# Patient Record
Sex: FEMALE | Race: WHITE | Employment: FULL TIME | URBAN - METROPOLITAN AREA
[De-identification: names, ages, dates, MRNs, and addresses within clinical notes are randomized per-mention and may not be internally consistent; named-entity substitution may affect disease eponyms.]

---

## 2018-12-28 ENCOUNTER — HOSPITAL ENCOUNTER (EMERGENCY)
Age: 28
Discharge: HOME | End: 2018-12-28

## 2018-12-28 VITALS
OXYGEN SATURATION: 98 % | WEIGHT: 210 LBS | HEIGHT: 69 IN | BODY MASS INDEX: 31.1 KG/M2 | SYSTOLIC BLOOD PRESSURE: 131 MMHG | TEMPERATURE: 98.1 F | RESPIRATION RATE: 16 BRPM | DIASTOLIC BLOOD PRESSURE: 67 MMHG | HEART RATE: 84 BPM
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
## 2018-12-28 PROCEDURE — 90715 TDAP VACCINE 7 YRS/> IM: CPT | Performed by: PHYSICIAN ASSISTANT

## 2018-12-28 PROCEDURE — 6360000002 HC RX W HCPCS: Performed by: PHYSICIAN ASSISTANT

## 2018-12-28 PROCEDURE — 90471 IMMUNIZATION ADMIN: CPT | Performed by: PHYSICIAN ASSISTANT

## 2018-12-28 PROCEDURE — 99283 EMERGENCY DEPT VISIT LOW MDM: CPT

## 2018-12-28 RX ORDER — AMOXICILLIN AND CLAVULANATE POTASSIUM 875; 125 MG/1; MG/1
1 TABLET, FILM COATED ORAL 2 TIMES DAILY
Qty: 6 TABLET | Refills: 0 | Status: SHIPPED | OUTPATIENT
Start: 2018-12-28 | End: 2018-12-31

## 2018-12-28 RX ADMIN — TETANUS TOXOID, REDUCED DIPHTHERIA TOXOID AND ACELLULAR PERTUSSIS VACCINE, ADSORBED 0.5 ML: 5; 2.5; 8; 8; 2.5 SUSPENSION INTRAMUSCULAR at 12:16

## 2018-12-28 ASSESSMENT — PAIN DESCRIPTION - PAIN TYPE: TYPE: ACUTE PAIN

## 2018-12-28 ASSESSMENT — PAIN SCALES - GENERAL: PAINLEVEL_OUTOF10: 6

## 2018-12-28 ASSESSMENT — PAIN DESCRIPTION - DESCRIPTORS: DESCRIPTORS: SORE

## 2018-12-28 ASSESSMENT — PAIN DESCRIPTION - ORIENTATION: ORIENTATION: LEFT;RIGHT

## 2018-12-28 ASSESSMENT — PAIN DESCRIPTION - LOCATION: LOCATION: LEG

## 2019-03-06 ENCOUNTER — HOSPITAL ENCOUNTER (EMERGENCY)
Age: 29
Discharge: HOME | End: 2019-03-06
Payer: COMMERCIAL

## 2019-03-06 VITALS
OXYGEN SATURATION: 100 % | DIASTOLIC BLOOD PRESSURE: 71 MMHG | BODY MASS INDEX: 31.1 KG/M2 | WEIGHT: 210 LBS | HEART RATE: 95 BPM | RESPIRATION RATE: 16 BRPM | TEMPERATURE: 98.3 F | SYSTOLIC BLOOD PRESSURE: 117 MMHG | HEIGHT: 69 IN

## 2019-03-06 DIAGNOSIS — R51.9 NONINTRACTABLE HEADACHE, UNSPECIFIED CHRONICITY PATTERN, UNSPECIFIED HEADACHE TYPE: ICD-10-CM

## 2019-03-06 DIAGNOSIS — R52 BODY ACHES: Primary | ICD-10-CM

## 2019-03-06 DIAGNOSIS — R05.9 COUGH: ICD-10-CM

## 2019-03-06 PROCEDURE — 96374 THER/PROPH/DIAG INJ IV PUSH: CPT

## 2019-03-06 PROCEDURE — 99283 EMERGENCY DEPT VISIT LOW MDM: CPT

## 2019-03-06 PROCEDURE — 96375 TX/PRO/DX INJ NEW DRUG ADDON: CPT

## 2019-03-06 PROCEDURE — 2580000003 HC RX 258: Performed by: PHYSICIAN ASSISTANT

## 2019-03-06 PROCEDURE — 6360000002 HC RX W HCPCS: Performed by: PHYSICIAN ASSISTANT

## 2019-03-06 RX ORDER — 0.9 % SODIUM CHLORIDE 0.9 %
1000 INTRAVENOUS SOLUTION INTRAVENOUS ONCE
Status: COMPLETED | OUTPATIENT
Start: 2019-03-06 | End: 2019-03-06

## 2019-03-06 RX ORDER — KETOROLAC TROMETHAMINE 30 MG/ML
30 INJECTION, SOLUTION INTRAMUSCULAR; INTRAVENOUS ONCE
Status: COMPLETED | OUTPATIENT
Start: 2019-03-06 | End: 2019-03-06

## 2019-03-06 RX ORDER — IBUPROFEN 600 MG/1
600 TABLET ORAL EVERY 6 HOURS PRN
Qty: 30 TABLET | Refills: 0 | Status: SHIPPED | OUTPATIENT
Start: 2019-03-06

## 2019-03-06 RX ORDER — ACETAMINOPHEN 500 MG
500 TABLET ORAL EVERY 6 HOURS PRN
Qty: 30 TABLET | Refills: 0 | Status: ON HOLD | OUTPATIENT
Start: 2019-03-06 | End: 2022-02-23 | Stop reason: HOSPADM

## 2019-03-06 RX ORDER — ONDANSETRON 2 MG/ML
4 INJECTION INTRAMUSCULAR; INTRAVENOUS ONCE
Status: COMPLETED | OUTPATIENT
Start: 2019-03-06 | End: 2019-03-06

## 2019-03-06 RX ADMIN — ONDANSETRON 4 MG: 2 INJECTION INTRAMUSCULAR; INTRAVENOUS at 08:38

## 2019-03-06 RX ADMIN — SODIUM CHLORIDE 1000 ML: 9 INJECTION, SOLUTION INTRAVENOUS at 08:02

## 2019-03-06 RX ADMIN — KETOROLAC TROMETHAMINE 30 MG: 30 INJECTION, SOLUTION INTRAMUSCULAR at 08:38

## 2019-03-06 ASSESSMENT — PAIN DESCRIPTION - LOCATION: LOCATION: GENERALIZED

## 2019-03-06 ASSESSMENT — PAIN DESCRIPTION - PAIN TYPE: TYPE: ACUTE PAIN

## 2019-03-06 ASSESSMENT — PAIN SCALES - GENERAL
PAINLEVEL_OUTOF10: 6
PAINLEVEL_OUTOF10: 7

## 2019-03-08 ENCOUNTER — APPOINTMENT (OUTPATIENT)
Dept: GENERAL RADIOLOGY | Age: 29
End: 2019-03-08
Payer: COMMERCIAL

## 2019-03-08 ENCOUNTER — HOSPITAL ENCOUNTER (EMERGENCY)
Age: 29
Discharge: HOME | End: 2019-03-08
Attending: EMERGENCY MEDICINE
Payer: COMMERCIAL

## 2019-03-08 VITALS
TEMPERATURE: 101 F | BODY MASS INDEX: 31.1 KG/M2 | HEIGHT: 69 IN | DIASTOLIC BLOOD PRESSURE: 62 MMHG | HEART RATE: 87 BPM | RESPIRATION RATE: 18 BRPM | OXYGEN SATURATION: 96 % | SYSTOLIC BLOOD PRESSURE: 114 MMHG | WEIGHT: 210 LBS

## 2019-03-08 DIAGNOSIS — R50.9 FEVER, UNSPECIFIED FEVER CAUSE: ICD-10-CM

## 2019-03-08 DIAGNOSIS — R53.83 FATIGUE, UNSPECIFIED TYPE: ICD-10-CM

## 2019-03-08 DIAGNOSIS — J02.9 SORE THROAT: Primary | ICD-10-CM

## 2019-03-08 DIAGNOSIS — R06.00 DYSPNEA, UNSPECIFIED TYPE: ICD-10-CM

## 2019-03-08 DIAGNOSIS — N39.0 URINARY TRACT INFECTION WITHOUT HEMATURIA, SITE UNSPECIFIED: ICD-10-CM

## 2019-03-08 DIAGNOSIS — R68.89 FLU-LIKE SYMPTOMS: ICD-10-CM

## 2019-03-08 LAB
ALBUMIN SERPL-MCNC: 3.5 GM/DL (ref 3.4–5)
ALP BLD-CCNC: 95 IU/L (ref 40–129)
ALT SERPL-CCNC: 25 U/L (ref 10–40)
ANION GAP SERPL CALCULATED.3IONS-SCNC: 16 MMOL/L (ref 4–16)
AST SERPL-CCNC: 20 IU/L (ref 15–37)
BACTERIA: ABNORMAL /HPF
BASOPHILS ABSOLUTE: 0 K/CU MM
BASOPHILS RELATIVE PERCENT: 0.3 % (ref 0–1)
BILIRUB SERPL-MCNC: 0.9 MG/DL (ref 0–1)
BILIRUBIN URINE: NEGATIVE MG/DL
BLOOD, URINE: ABNORMAL
BUN BLDV-MCNC: 6 MG/DL (ref 6–23)
CALCIUM SERPL-MCNC: 8.1 MG/DL (ref 8.3–10.6)
CHLORIDE BLD-SCNC: 99 MMOL/L (ref 99–110)
CLARITY: ABNORMAL
CO2: 22 MMOL/L (ref 21–32)
COLOR: ABNORMAL
CREAT SERPL-MCNC: 0.8 MG/DL (ref 0.6–1.1)
DIFFERENTIAL TYPE: ABNORMAL
EOSINOPHILS ABSOLUTE: 0 K/CU MM
EOSINOPHILS RELATIVE PERCENT: 0 % (ref 0–3)
GFR AFRICAN AMERICAN: >60 ML/MIN/1.73M2
GFR NON-AFRICAN AMERICAN: >60 ML/MIN/1.73M2
GLUCOSE BLD-MCNC: 114 MG/DL (ref 70–99)
GLUCOSE, URINE: NEGATIVE MG/DL
GONADOTROPIN, CHORIONIC (HCG) QUANT: <0.5 UIU/ML
HCT VFR BLD CALC: 41.6 % (ref 37–47)
HEMOGLOBIN: 13.2 GM/DL (ref 12.5–16)
HETEROPHILE ANTIBODIES: NEGATIVE
IMMATURE NEUTROPHIL %: 0.4 % (ref 0–0.43)
KETONES, URINE: ABNORMAL MG/DL
LEUKOCYTE ESTERASE, URINE: ABNORMAL
LYMPHOCYTES ABSOLUTE: 1.2 K/CU MM
LYMPHOCYTES RELATIVE PERCENT: 12.6 % (ref 24–44)
MCH RBC QN AUTO: 28.4 PG (ref 27–31)
MCHC RBC AUTO-ENTMCNC: 31.7 % (ref 32–36)
MCV RBC AUTO: 89.5 FL (ref 78–100)
MONOCYTES ABSOLUTE: 1.2 K/CU MM
MONOCYTES RELATIVE PERCENT: 13.1 % (ref 0–4)
MUCUS: ABNORMAL HPF
NITRITE URINE, QUANTITATIVE: POSITIVE
NON SQUAM EPI CELLS: 1 /HPF
NUCLEATED RBC %: 0 %
PDW BLD-RTO: 12.8 % (ref 11.7–14.9)
PH, URINE: 7 (ref 5–8)
PLATELET # BLD: 154 K/CU MM (ref 140–440)
PMV BLD AUTO: 11.6 FL (ref 7.5–11.1)
POTASSIUM SERPL-SCNC: 3.4 MMOL/L (ref 3.5–5.1)
PROTEIN UA: 100 MG/DL
RAPID INFLUENZA  B AGN: NEGATIVE
RAPID INFLUENZA A AGN: NEGATIVE
RBC # BLD: 4.65 M/CU MM (ref 4.2–5.4)
RBC URINE: 21 /HPF (ref 0–6)
SEGMENTED NEUTROPHILS ABSOLUTE COUNT: 6.8 K/CU MM
SEGMENTED NEUTROPHILS RELATIVE PERCENT: 73.6 % (ref 36–66)
SODIUM BLD-SCNC: 137 MMOL/L (ref 135–145)
SPECIFIC GRAVITY UA: 1.02 (ref 1–1.03)
SQUAMOUS EPITHELIAL: 10 /HPF
TOTAL IMMATURE NEUTOROPHIL: 0.04 K/CU MM
TOTAL NUCLEATED RBC: 0 K/CU MM
TOTAL PROTEIN: 7.3 GM/DL (ref 6.4–8.2)
TRICHOMONAS: ABNORMAL /HPF
UROBILINOGEN, URINE: 2 MG/DL (ref 0.2–1)
WBC # BLD: 9.2 K/CU MM (ref 4–10.5)
WBC CLUMP: ABNORMAL /HPF
WBC UA: 120 /HPF (ref 0–5)

## 2019-03-08 PROCEDURE — 80053 COMPREHEN METABOLIC PANEL: CPT

## 2019-03-08 PROCEDURE — 86318 IA INFECTIOUS AGENT ANTIBODY: CPT

## 2019-03-08 PROCEDURE — 2580000003 HC RX 258: Performed by: EMERGENCY MEDICINE

## 2019-03-08 PROCEDURE — 87804 INFLUENZA ASSAY W/OPTIC: CPT

## 2019-03-08 PROCEDURE — 87430 STREP A AG IA: CPT

## 2019-03-08 PROCEDURE — 71045 X-RAY EXAM CHEST 1 VIEW: CPT

## 2019-03-08 PROCEDURE — 96365 THER/PROPH/DIAG IV INF INIT: CPT

## 2019-03-08 PROCEDURE — 81001 URINALYSIS AUTO W/SCOPE: CPT

## 2019-03-08 PROCEDURE — 36415 COLL VENOUS BLD VENIPUNCTURE: CPT

## 2019-03-08 PROCEDURE — 85025 COMPLETE CBC W/AUTO DIFF WBC: CPT

## 2019-03-08 PROCEDURE — 87081 CULTURE SCREEN ONLY: CPT

## 2019-03-08 PROCEDURE — 99291 CRITICAL CARE FIRST HOUR: CPT

## 2019-03-08 PROCEDURE — 93010 ELECTROCARDIOGRAM REPORT: CPT | Performed by: INTERNAL MEDICINE

## 2019-03-08 PROCEDURE — 84702 CHORIONIC GONADOTROPIN TEST: CPT

## 2019-03-08 PROCEDURE — 6370000000 HC RX 637 (ALT 250 FOR IP): Performed by: EMERGENCY MEDICINE

## 2019-03-08 PROCEDURE — 6360000002 HC RX W HCPCS: Performed by: EMERGENCY MEDICINE

## 2019-03-08 PROCEDURE — 93005 ELECTROCARDIOGRAM TRACING: CPT | Performed by: EMERGENCY MEDICINE

## 2019-03-08 RX ORDER — ACETAMINOPHEN 325 MG/1
650 TABLET ORAL EVERY 6 HOURS PRN
Qty: 120 TABLET | Refills: 3 | Status: ON HOLD | OUTPATIENT
Start: 2019-03-08 | End: 2022-02-23 | Stop reason: HOSPADM

## 2019-03-08 RX ORDER — NAPROXEN 500 MG/1
500 TABLET ORAL 2 TIMES DAILY
Qty: 60 TABLET | Refills: 0 | Status: ON HOLD | OUTPATIENT
Start: 2019-03-08 | End: 2022-02-23 | Stop reason: HOSPADM

## 2019-03-08 RX ORDER — 0.9 % SODIUM CHLORIDE 0.9 %
1000 INTRAVENOUS SOLUTION INTRAVENOUS ONCE
Status: COMPLETED | OUTPATIENT
Start: 2019-03-08 | End: 2019-03-08

## 2019-03-08 RX ORDER — ACETAMINOPHEN 500 MG
1000 TABLET ORAL ONCE
Status: COMPLETED | OUTPATIENT
Start: 2019-03-08 | End: 2019-03-08

## 2019-03-08 RX ORDER — ONDANSETRON 4 MG/1
4 TABLET, ORALLY DISINTEGRATING ORAL EVERY 8 HOURS PRN
Qty: 15 TABLET | Refills: 0 | Status: SHIPPED | OUTPATIENT
Start: 2019-03-08 | End: 2022-02-18

## 2019-03-08 RX ORDER — CEPHALEXIN 500 MG/1
500 CAPSULE ORAL 4 TIMES DAILY
Qty: 28 CAPSULE | Refills: 0 | Status: SHIPPED | OUTPATIENT
Start: 2019-03-08 | End: 2019-03-15

## 2019-03-08 RX ORDER — FAMOTIDINE 20 MG/1
20 TABLET, FILM COATED ORAL 2 TIMES DAILY
Qty: 14 TABLET | Refills: 0 | Status: SHIPPED | OUTPATIENT
Start: 2019-03-08 | End: 2020-07-24

## 2019-03-08 RX ADMIN — ACETAMINOPHEN 1000 MG: 500 TABLET ORAL at 01:35

## 2019-03-08 RX ADMIN — SODIUM CHLORIDE 1000 ML: 9 INJECTION, SOLUTION INTRAVENOUS at 01:35

## 2019-03-08 RX ADMIN — CEFTRIAXONE SODIUM 1 G: 1 INJECTION, POWDER, FOR SOLUTION INTRAMUSCULAR; INTRAVENOUS at 04:40

## 2019-03-08 ASSESSMENT — ENCOUNTER SYMPTOMS
RHINORRHEA: 1
GASTROINTESTINAL NEGATIVE: 1
ALLERGIC/IMMUNOLOGIC NEGATIVE: 1
EYES NEGATIVE: 1
COUGH: 1
SORE THROAT: 1

## 2019-03-08 ASSESSMENT — PAIN DESCRIPTION - PAIN TYPE: TYPE: ACUTE PAIN

## 2019-03-08 ASSESSMENT — PAIN SCALES - GENERAL: PAINLEVEL_OUTOF10: 8

## 2019-03-08 ASSESSMENT — PAIN DESCRIPTION - LOCATION: LOCATION: HEAD;LEG

## 2019-03-10 LAB
CULTURE: ABNORMAL
Lab: ABNORMAL
SPECIMEN: ABNORMAL
STREP A DIRECT SCREEN: NEGATIVE

## 2019-03-19 LAB
EKG ATRIAL RATE: 94 BPM
EKG DIAGNOSIS: NORMAL
EKG P AXIS: 39 DEGREES
EKG P-R INTERVAL: 124 MS
EKG Q-T INTERVAL: 344 MS
EKG QRS DURATION: 70 MS
EKG QTC CALCULATION (BAZETT): 430 MS
EKG R AXIS: 47 DEGREES
EKG T AXIS: 18 DEGREES
EKG VENTRICULAR RATE: 94 BPM

## 2020-07-24 ENCOUNTER — HOSPITAL ENCOUNTER (EMERGENCY)
Age: 30
Discharge: HOME | End: 2020-07-24
Payer: COMMERCIAL

## 2020-07-24 VITALS
SYSTOLIC BLOOD PRESSURE: 127 MMHG | DIASTOLIC BLOOD PRESSURE: 75 MMHG | OXYGEN SATURATION: 98 % | TEMPERATURE: 98.1 F | WEIGHT: 210 LBS | HEIGHT: 69 IN | RESPIRATION RATE: 16 BRPM | BODY MASS INDEX: 31.1 KG/M2 | HEART RATE: 70 BPM

## 2020-07-24 PROCEDURE — 6370000000 HC RX 637 (ALT 250 FOR IP): Performed by: PHYSICIAN ASSISTANT

## 2020-07-24 PROCEDURE — 99283 EMERGENCY DEPT VISIT LOW MDM: CPT

## 2020-07-24 RX ORDER — FAMOTIDINE 20 MG/1
20 TABLET, FILM COATED ORAL ONCE
Status: COMPLETED | OUTPATIENT
Start: 2020-07-24 | End: 2020-07-24

## 2020-07-24 RX ORDER — FAMOTIDINE 20 MG/1
20 TABLET, FILM COATED ORAL 2 TIMES DAILY
Qty: 20 TABLET | Refills: 0 | Status: ON HOLD | OUTPATIENT
Start: 2020-07-24 | End: 2022-02-23

## 2020-07-24 RX ORDER — DIPHENHYDRAMINE HCL 25 MG
25 CAPSULE ORAL EVERY 6 HOURS PRN
Qty: 20 CAPSULE | Refills: 0 | Status: SHIPPED | OUTPATIENT
Start: 2020-07-24 | End: 2020-08-03

## 2020-07-24 RX ORDER — PREDNISONE 50 MG/1
50 TABLET ORAL DAILY
Qty: 7 TABLET | Refills: 0 | Status: SHIPPED | OUTPATIENT
Start: 2020-07-24 | End: 2020-07-31

## 2020-07-24 RX ORDER — SULFAMETHOXAZOLE AND TRIMETHOPRIM 800; 160 MG/1; MG/1
1 TABLET ORAL 2 TIMES DAILY
Qty: 20 TABLET | Refills: 0 | Status: SHIPPED | OUTPATIENT
Start: 2020-07-24 | End: 2020-08-03

## 2020-07-24 RX ORDER — DIPHENHYDRAMINE HCL 25 MG
25 TABLET ORAL ONCE
Status: COMPLETED | OUTPATIENT
Start: 2020-07-24 | End: 2020-07-24

## 2020-07-24 RX ADMIN — DIPHENHYDRAMINE HYDROCHLORIDE 25 MG: 25 TABLET ORAL at 09:31

## 2020-07-24 RX ADMIN — FAMOTIDINE 20 MG: 20 TABLET ORAL at 09:31

## 2020-07-24 RX ADMIN — PREDNISONE 50 MG: 20 TABLET ORAL at 09:31

## 2020-07-24 ASSESSMENT — VISUAL ACUITY
OU: 20/20
OD: 20/20
OS: 20/20

## 2020-07-24 NOTE — ED PROVIDER NOTES
EMERGENCY DEPARTMENT ENCOUNTER      PCP: No primary care provider on file. CHIEF COMPLAINT    Chief Complaint   Patient presents with    Eye Problem     swelling and redness to left eye. denies drainage       HPI    Angelina Parent is a 35 y.o. female who presents with swelling, redness and pruritus around the left eye. Symptoms began 2 days ago while she was at work. She states that she had some itchiness around the eye at that time. She was working on cleaning of the vehicle and states that she may have been exposed to some kind of cleaning agent. She took some Benadryl and did have improvement of symptoms. Since then she has had continued redness and swelling periorbital swelling of left eye and a couple spots on her forehead also have some redness and itchiness. No eye redness or foreign body sensation. No visual changes, flashes or floaters. She denies contact lens use. No fevers, chills. No pain with eye movement. No associated shortness of breath or wheezing. No known allergies. REVIEW OF SYSTEMS    General: No fevers or chills  ENT:-See HPI  GI: No abdominal pain, nausea or vomiting  Skin: No new rash  Pulmonary: No shortness of breath or new cough    All other review of systems are negative  See HPI and nursing notes for additional information     PAST MEDICAL and SURGICAL HISTORY    Past Medical History:   Diagnosis Date    Gallbladder disease      History reviewed. No pertinent surgical history.     CURRENT MEDICATIONS    Current Outpatient Rx   Medication Sig Dispense Refill    ondansetron (ZOFRAN ODT) 4 MG disintegrating tablet Take 1 tablet by mouth every 8 hours as needed for Nausea 15 tablet 0    naproxen (NAPROSYN) 500 MG tablet Take 1 tablet by mouth 2 times daily 60 tablet 0    acetaminophen (TYLENOL) 325 MG tablet Take 2 tablets by mouth every 6 hours as needed for Pain 120 tablet 3    famotidine (PEPCID) 20 MG tablet Take 1 tablet by mouth 2 times daily 14 tablet 0    moist, mildly injected. EOMI without pain  Funduscopic exam reveals red reflex intact with no obvious retinal abnormalities. No foreign bodies or obvious corneal deficit. No hyphema seen on tangential light    HENT:   Left-sided periorbital erythema and swelling. Still able to see entire pupil and iris of left eye. There are 2 small areas of macular erythema, slightly raised noted to forehead. No crepitus or tenderness to palpation of these areas. No streaking of erythema. Atraumatic, external ears normal, nose normal, oropharynx moist, no pharyngeal exudates. Oropharynx patent without tonsillar hypertrophy or exudates. Neck/Lymphatics: supple, no JVD, no swollen nodes   Respiratory:  No retractions, lungs clear without wheezing  Cardiovascular:  No JVD  Integument:  Warm dry skin, no obvious rash, no evidence of Cali sign, See HENT  Neurologic:  No slurred speech, normal gait       Visual acuity:  See nursing note        ED COURSE & MEDICAL DECISION MAKING       Vital signs and nursing notes reviewed during ED course. I have independently evaluated this patient . Supervising MD present in the Emergency Department, available for consultation, throughout entirety of  patient care. All pertinent Lab data and radiographic results reviewed with patient at bedside. Patient presents as above. She is hemodynamically stable, afebrile. No conjunctival injection, foreign body sensation, pain with extraocular eye movements. Symptoms appear to only be in periorbital region and and couple areas on forehead most consistent with allergic reaction. She is given dose of prednisone, Benadryl and Pepcid here in the ED with mild improvement of symptoms. Will discharge with prednisone, Benadryl and Pepcid. We will also discharge with watch and wait prescription of Bactrim if symptoms are not gradually improving to treat for possible early preseptal cellulitis.   No evidence of deep space infection or other acute emergent process at this time including orbital cellulitis/abscess. She is given primary care follow-up and is to immediately return with any new or worsening symptoms. The patient and/or the family were informed of the results of any tests/labs/imaging, the treatment plan, and time was allotted to answer questions. As patient denies flashes/floaters, changes in visual acquity, or pain with extra ocular movement, I believe patient does not need further emergent imaging and is reasonable to discharge with close follow up. I estimate there is LOW risk for RETAINED FOREIGN BODY, ULCERATION, DEEP SPACE INFECTION (Ex. POST-SEPTAL ABSCESS), ACUTE GLAUCOMA, PENETRATING GLOBE INJURY, RETINAL DETACHMENT, or MENINGITIS thus I consider the discharge disposition reasonable. Lenin Jennings (or their surrogate) and I have discussed the diagnosis and risks, and we agree with discharging home with close follow-up. We also discussed returning to the Emergency Department immediately if new or worsening symptoms occur. We have discussed the symptoms which are most concerning that necessitate immediate return. Clinical  IMPRESSION    1. Periorbital erythema    2. Periorbital swelling        Discharge with medication and followup with ophthalmologist (see EMR)    Diagnosis and plan discussed in detail with patient who understands and agrees. Patient agrees to return emergency department if symptoms worsen or any new symptoms develop. Comment: Please note this report has been produced using speech recognition software and may contain errors related to that system including errors in grammar, punctuation, and spelling, as well as words and phrases that may be inappropriate. If there are any questions or concerns please feel free to contact the dictating provider for clarification.         Shruthi Whittaker [place]  07/24/20 8946

## 2020-08-02 ENCOUNTER — HOSPITAL ENCOUNTER (EMERGENCY)
Age: 30
Discharge: HOME | End: 2020-08-02
Attending: EMERGENCY MEDICINE
Payer: COMMERCIAL

## 2020-08-02 VITALS
WEIGHT: 210 LBS | BODY MASS INDEX: 31.1 KG/M2 | DIASTOLIC BLOOD PRESSURE: 60 MMHG | TEMPERATURE: 97.6 F | RESPIRATION RATE: 16 BRPM | SYSTOLIC BLOOD PRESSURE: 94 MMHG | OXYGEN SATURATION: 98 % | HEIGHT: 69 IN | HEART RATE: 97 BPM

## 2020-08-02 PROCEDURE — 99282 EMERGENCY DEPT VISIT SF MDM: CPT

## 2020-08-02 PROCEDURE — 6370000000 HC RX 637 (ALT 250 FOR IP): Performed by: EMERGENCY MEDICINE

## 2020-08-02 RX ORDER — PREDNISONE 20 MG/1
60 TABLET ORAL ONCE
Status: COMPLETED | OUTPATIENT
Start: 2020-08-02 | End: 2020-08-02

## 2020-08-02 RX ORDER — METHYLPREDNISOLONE 4 MG/1
TABLET ORAL
Qty: 1 KIT | Refills: 0 | Status: SHIPPED | OUTPATIENT
Start: 2020-08-02 | End: 2020-08-08

## 2020-08-02 RX ADMIN — PREDNISONE 60 MG: 20 TABLET ORAL at 09:45

## 2020-08-02 NOTE — ED NOTES
The patient states that the itchy skin and redness is on both arms and legs. The patient denies shortness of breath.      Virginia Lema RN  08/02/20 6708

## 2020-08-02 NOTE — ED TRIAGE NOTES
Pt to ED with c/o rash \"all over. \" Pt states was rx antibiotics and steroids last Friday for \"an allergic reaction. \" States she did not have a rash at that time. Pt a&ox4.

## 2020-08-02 NOTE — ED NOTES
Discharge instructions reviewed with patient. The patient voiced understanding of the discharge paperwork and received one prescriptions. The patient left alert and oriented with no questions or concerns.      Tushar Dai RN  08/02/20 0269

## 2020-08-02 NOTE — ED PROVIDER NOTES
Emergency Department Encounter    Patient: Deandre Virk  MRN: [id number]  : 1990  Date of Evaluation: 2020  ED Provider:  Tressa Ho    Triage Chief Complaint:   Rash    [place]:  Deandre Virk is a 35 y.o. female that presents with pruritic rash over her arms and legs that has been present for the last couple of days. She has been taking Benadryl and using cortisone cream for the rash. She was seen here last Friday and was diagnosed with allergic reaction to her eye but also put on Bactrim. No shortness of breath or wheezing. No nausea or vomiting. No lightheadedness or dizziness. She has never had a reaction like this previously. Denies any new detergents or soaps. No new exposures. She is not spending any significant amount of time outside. No fevers or chills. ROS - see HPI, below listed is current ROS at time of my eval:  General:  No fevers, no chills  Eyes:  no discharge  ENT:  No sore throat, no nasal congestion,  Respiratory:   no cough, no wheezing  Gastrointestinal:  No pain, no vomiting, no diarrhea  Musculoskeletal:  No muscle pain, no joint pain  Skin:  + rash, + pruritis, no easy bruising  Genitourinary:  No dysuria, no hematuria  Endocrine:  No unexpected weight gain, no unexpected weight loss  Extremities:  no pain    Past Medical History:   Diagnosis Date    Gallbladder disease      History reviewed. No pertinent surgical history. History reviewed. No pertinent family history.   Social History     Socioeconomic History    Marital status:      Spouse name: Not on file    Number of children: Not on file    Years of education: Not on file    Highest education level: Not on file   Occupational History    Not on file   Social Needs    Financial resource strain: Not on file    Food insecurity     Worry: Not on file     Inability: Not on file    Transportation needs     Medical: Not on file     Non-medical: Not on file   Tobacco Use    Smoking status: Never Smoker    Smokeless tobacco: Never Used   Substance and Sexual Activity    Alcohol use: No    Drug use: No    Sexual activity: Not on file   Lifestyle    Physical activity     Days per week: Not on file     Minutes per session: Not on file    Stress: Not on file   Relationships    Social connections     Talks on phone: Not on file     Gets together: Not on file     Attends L[employer]-day service: Not on file     Active member of club or organization: Not on file     Attends meetings of clubs or organizations: Not on file     Relationship status: Not on file    Intimate partner violence     Fear of current or ex partner: Not on file     Emotionally abused: Not on file     Physically abused: Not on file     Forced sexual activity: Not on file   Other Topics Concern    Not on file   Social History Narrative    Not on file     No current facility-administered medications for this encounter. Current Outpatient Medications   Medication Sig Dispense Refill    methylPREDNISolone (MEDROL DOSEPACK) 4 MG tablet Take by mouth.  1 kit 0    famotidine (PEPCID) 20 MG tablet Take 1 tablet by mouth 2 times daily 20 tablet 0    ondansetron (ZOFRAN ODT) 4 MG disintegrating tablet Take 1 tablet by mouth every 8 hours as needed for Nausea 15 tablet 0    naproxen (NAPROSYN) 500 MG tablet Take 1 tablet by mouth 2 times daily 60 tablet 0    acetaminophen (TYLENOL) 325 MG tablet Take 2 tablets by mouth every 6 hours as needed for Pain 120 tablet 3    acetaminophen (APAP EXTRA STRENGTH) 500 MG tablet Take 1 tablet by mouth every 6 hours as needed for Pain 30 tablet 0    ibuprofen (ADVIL;MOTRIN) 600 MG tablet Take 1 tablet by mouth every 6 hours as needed for Pain 30 tablet 0    dicyclomine (BENTYL) 10 MG capsule Take 1 capsule by mouth 4 times daily (before meals and nightly) 30 capsule 3     No Known Allergies    Nursing Notes Reviewed    Physical Exam:  Triage VS:    ED Triage Vitals [08/02/20 0916]   [facility] Vitals Group /76      Pulse 81      Resp 16      Temp 97.6 °F (36.4 °C)      Temp Source Oral      SpO2 98 %      Weight 210 lb (95.3 kg)      Height 5' 9\" (1.753 m)      Head Circumference       Peak Flow       Pain Score       Pain Loc       Pain Edu? Excl. in [address]? My pulse ox interpretation is - normal    General appearance:  No acute distress. Skin:  Warm. Dry. Blanchable erythematous macules over upper legs and arms, pruritic. No skin sloughing, blisters or drainage. Eye:  Extraocular movements intact. Ears, nose, mouth and throat:  Oral mucosa moist   Neck:  Trachea midline. Extremity:  No swelling. Normal ROM     Heart:  Regular  Perfusion:  intact  Respiratory:  Lungs clear to auscultation bilaterally. Respirations nonlabored. Abdominal:   Non distended. Neurological:  Alert and oriented    I have reviewed and interpreted all of the currently available lab results from this visit (if applicable):  No results found for this visit on 08/02/20. Radiographs (if obtained):  Radiologist's Report Reviewed:  No results found. MDM:  Patient presented secondary to a dermatologic abnormality. Patient's rash does not appear emergent at this time. I do suspect she may be having a reaction to the Bactrim. The patient does not have any evidence of emergent underlying infectious processes at this time and generally appears well. Patient does not have an exfoliative dermatitis. I do believe that the patient can be treated as an outpatient at this time. We will have her stop taking Bactrim and treat her with steroids. Have her continue taking Benadryl as needed. No wheezing and normal vital signs. I do not suspect anaphylaxis. Recommended close follow-up with her primary care physician. Given strict return precautions.   Patient understands that at this time there is no evidence for an underlying malignant process however early in the process of an illness and initial workup/evaluation can be reassuring/negative. The patient will be discharged, treated symptomatically, medication instructions/education provided, they understand and agree with the plan, return warnings given. I did don appropriate PPE (including N95 face mask, protective eye ware/safety glasses, gloves, hair covering, and no isolation gown), as recommended by the health facility/national standard best practice, during my bedside interactions with the patient. Clinical Impression:  1. Rash and other nonspecific skin eruption      Disposition referral (if applicable): Your Primary Care Physician    In 2 days      DO Jose Patel [phone]    In 2 days      [facility] Emergency Department  [facility] [phone] [id number] [phone]    As needed, If symptoms worsen    Disposition medications (if applicable):  Discharge Medication List as of 8/2/2020 10:17 AM      START taking these medications    Details   methylPREDNISolone (MEDROL DOSEPACK) 4 MG tablet Take by mouth., Disp-1 kit,R-0Print           ED Provider Disposition Time  DISPOSITION Decision To Discharge 08/02/2020 09:47:22 AM      Comment: Please note this report has been produced using speech recognition software and may contain errors related to that system including errors in grammar, punctuation, and spelling, as well as words and phrases that may be inappropriate. Efforts were made to edit the dictations.         Amando Gray MD  08/07/20 4250

## 2020-08-02 NOTE — ED NOTES
The patient does feel that the itchiness has become less apparent and she feels better.        Mariela Kc RN  08/02/20 1018

## 2020-08-02 NOTE — ED NOTES
Upon evaluation of the client, they are observed to be alert and oriented to person, place and situation. The head of the bed is elevated above 30 degrees. The client verbalizes appropriately to all questions and/or comments. The client also makes eye contact when prompted. The client also exhibits unlabored breathing, their skin is pink, warm & dry, and are observed to have relaxed extremities. When communicating, the client speaks with clear speech and normal tone and is in no apparent distress. The call light is within reach, the bed is in the low position and neither side rails are up.      Dinorah Sloan RN  08/02/20 1785

## 2022-02-17 PROCEDURE — 84702 CHORIONIC GONADOTROPIN TEST: CPT

## 2022-02-17 PROCEDURE — 80053 COMPREHEN METABOLIC PANEL: CPT

## 2022-02-17 PROCEDURE — 99284 EMERGENCY DEPT VISIT MOD MDM: CPT

## 2022-02-17 PROCEDURE — 83690 ASSAY OF LIPASE: CPT

## 2022-02-17 PROCEDURE — 85025 COMPLETE CBC W/AUTO DIFF WBC: CPT

## 2022-02-17 PROCEDURE — 96372 THER/PROPH/DIAG INJ SC/IM: CPT

## 2022-02-17 RX ORDER — KETOROLAC TROMETHAMINE 30 MG/ML
30 INJECTION, SOLUTION INTRAMUSCULAR; INTRAVENOUS ONCE
Status: COMPLETED | OUTPATIENT
Start: 2022-02-18 | End: 2022-02-18

## 2022-02-17 RX ORDER — ONDANSETRON 4 MG/1
8 TABLET, ORALLY DISINTEGRATING ORAL ONCE
Status: COMPLETED | OUTPATIENT
Start: 2022-02-18 | End: 2022-02-18

## 2022-02-17 ASSESSMENT — PAIN DESCRIPTION - LOCATION
LOCATION: ABDOMEN
LOCATION: ABDOMEN

## 2022-02-17 ASSESSMENT — PAIN - FUNCTIONAL ASSESSMENT: PAIN_FUNCTIONAL_ASSESSMENT: 0-10

## 2022-02-17 ASSESSMENT — PAIN SCALES - GENERAL
PAINLEVEL_OUTOF10: 8
PAINLEVEL_OUTOF10: 10

## 2022-02-17 ASSESSMENT — PAIN DESCRIPTION - PAIN TYPE
TYPE: ACUTE PAIN
TYPE: ACUTE PAIN

## 2022-02-17 ASSESSMENT — PAIN DESCRIPTION - ORIENTATION
ORIENTATION: RIGHT;UPPER
ORIENTATION: RIGHT

## 2022-02-17 ASSESSMENT — PAIN DESCRIPTION - DESCRIPTORS
DESCRIPTORS: SHARP;CONSTANT
DESCRIPTORS: CONSTANT

## 2022-02-17 ASSESSMENT — PAIN DESCRIPTION - PROGRESSION
CLINICAL_PROGRESSION: GRADUALLY WORSENING
CLINICAL_PROGRESSION: GRADUALLY WORSENING

## 2022-02-17 ASSESSMENT — PAIN DESCRIPTION - ONSET: ONSET: GRADUAL

## 2022-02-18 ENCOUNTER — HOSPITAL ENCOUNTER (EMERGENCY)
Age: 32
Discharge: HOME | End: 2022-02-18
Payer: COMMERCIAL

## 2022-02-18 ENCOUNTER — APPOINTMENT (OUTPATIENT)
Dept: ULTRASOUND IMAGING | Age: 32
End: 2022-02-18
Payer: COMMERCIAL

## 2022-02-18 VITALS
TEMPERATURE: 97.7 F | HEART RATE: 59 BPM | WEIGHT: 230 LBS | BODY MASS INDEX: 34.07 KG/M2 | HEIGHT: 69 IN | OXYGEN SATURATION: 100 % | SYSTOLIC BLOOD PRESSURE: 132 MMHG | DIASTOLIC BLOOD PRESSURE: 75 MMHG | RESPIRATION RATE: 16 BRPM

## 2022-02-18 DIAGNOSIS — R11.2 NON-INTRACTABLE VOMITING WITH NAUSEA, UNSPECIFIED VOMITING TYPE: ICD-10-CM

## 2022-02-18 DIAGNOSIS — R10.11 RIGHT UPPER QUADRANT ABDOMINAL PAIN: Primary | ICD-10-CM

## 2022-02-18 LAB
ALBUMIN SERPL-MCNC: 4.7 GM/DL (ref 3.4–5)
ALP BLD-CCNC: 67 IU/L (ref 40–128)
ALT SERPL-CCNC: 21 U/L (ref 10–40)
ANION GAP SERPL CALCULATED.3IONS-SCNC: 15 MMOL/L (ref 4–16)
AST SERPL-CCNC: 18 IU/L (ref 15–37)
BACTERIA: NEGATIVE /HPF
BASOPHILS ABSOLUTE: 0 K/CU MM
BASOPHILS RELATIVE PERCENT: 0.3 % (ref 0–1)
BILIRUB SERPL-MCNC: 0.5 MG/DL (ref 0–1)
BILIRUBIN URINE: ABNORMAL MG/DL
BLOOD, URINE: NEGATIVE
BUN BLDV-MCNC: 10 MG/DL (ref 6–23)
CALCIUM SERPL-MCNC: 9.7 MG/DL (ref 8.3–10.6)
CHLORIDE BLD-SCNC: 102 MMOL/L (ref 99–110)
CLARITY: CLEAR
CO2: 21 MMOL/L (ref 21–32)
COLOR: YELLOW
CREAT SERPL-MCNC: 0.6 MG/DL (ref 0.6–1.1)
DIFFERENTIAL TYPE: ABNORMAL
EOSINOPHILS ABSOLUTE: 0 K/CU MM
EOSINOPHILS RELATIVE PERCENT: 0.2 % (ref 0–3)
GFR AFRICAN AMERICAN: >60 ML/MIN/1.73M2
GFR NON-AFRICAN AMERICAN: >60 ML/MIN/1.73M2
GLUCOSE BLD-MCNC: 100 MG/DL (ref 70–99)
GLUCOSE, URINE: NEGATIVE MG/DL
GONADOTROPIN, CHORIONIC (HCG) QUANT: 0.5 UIU/ML
HCT VFR BLD CALC: 45.1 % (ref 37–47)
HEMOGLOBIN: 15.2 GM/DL (ref 12.5–16)
ICTOTEST: NEGATIVE
IMMATURE NEUTROPHIL %: 0.4 % (ref 0–0.43)
KETONES, URINE: >80 MG/DL
LEUKOCYTE ESTERASE, URINE: ABNORMAL
LIPASE: 22 IU/L (ref 13–60)
LYMPHOCYTES ABSOLUTE: 1.5 K/CU MM
LYMPHOCYTES RELATIVE PERCENT: 10.2 % (ref 24–44)
MCH RBC QN AUTO: 28.6 PG (ref 27–31)
MCHC RBC AUTO-ENTMCNC: 33.7 % (ref 32–36)
MCV RBC AUTO: 84.8 FL (ref 78–100)
MONOCYTES ABSOLUTE: 0.6 K/CU MM
MONOCYTES RELATIVE PERCENT: 4 % (ref 0–4)
MUCUS: ABNORMAL HPF
NITRITE URINE, QUANTITATIVE: NEGATIVE
NUCLEATED RBC %: 0 %
PDW BLD-RTO: 12.4 % (ref 11.7–14.9)
PH, URINE: 5 (ref 5–8)
PLATELET # BLD: 294 K/CU MM (ref 140–440)
PMV BLD AUTO: 11.6 FL (ref 7.5–11.1)
POTASSIUM SERPL-SCNC: 3.7 MMOL/L (ref 3.5–5.1)
PROTEIN UA: NEGATIVE MG/DL
RBC # BLD: 5.32 M/CU MM (ref 4.2–5.4)
RBC URINE: ABNORMAL /HPF (ref 0–6)
SEGMENTED NEUTROPHILS ABSOLUTE COUNT: 12.5 K/CU MM
SEGMENTED NEUTROPHILS RELATIVE PERCENT: 84.9 % (ref 36–66)
SODIUM BLD-SCNC: 138 MMOL/L (ref 135–145)
SPECIFIC GRAVITY UA: >1.03 (ref 1–1.03)
SQUAMOUS EPITHELIAL: 6 /HPF
TOTAL IMMATURE NEUTOROPHIL: 0.06 K/CU MM
TOTAL NUCLEATED RBC: 0 K/CU MM
TOTAL PROTEIN: 7.8 GM/DL (ref 6.4–8.2)
UROBILINOGEN, URINE: 1 MG/DL (ref 0.2–1)
WBC # BLD: 14.7 K/CU MM (ref 4–10.5)
WBC UA: 1 /HPF (ref 0–5)

## 2022-02-18 PROCEDURE — 6360000002 HC RX W HCPCS: Performed by: EMERGENCY MEDICINE

## 2022-02-18 PROCEDURE — 76705 ECHO EXAM OF ABDOMEN: CPT

## 2022-02-18 PROCEDURE — 81001 URINALYSIS AUTO W/SCOPE: CPT

## 2022-02-18 PROCEDURE — 6370000000 HC RX 637 (ALT 250 FOR IP): Performed by: EMERGENCY MEDICINE

## 2022-02-18 RX ORDER — ONDANSETRON 4 MG/1
4 TABLET, ORALLY DISINTEGRATING ORAL EVERY 8 HOURS PRN
Qty: 10 TABLET | Refills: 0 | Status: SHIPPED | OUTPATIENT
Start: 2022-02-18 | End: 2022-07-15

## 2022-02-18 RX ADMIN — ONDANSETRON 8 MG: 4 TABLET, ORALLY DISINTEGRATING ORAL at 01:30

## 2022-02-18 RX ADMIN — KETOROLAC TROMETHAMINE 30 MG: 30 INJECTION, SOLUTION INTRAMUSCULAR at 01:30

## 2022-02-18 NOTE — ED NOTES
Pt presents to the emergency department alone and is ambulatory without assistance. ABCs are intact. The pt is notably in pain and uncomfortable. The pt states that \"I think I'm having a gallbladder attack. \" She states that she has a history of these and this started at approximately 1700. The pt states that her pain is in her RUQ and radiates to her back. She took Tylenol at approximately 1900. The pt did not find relief from the Tylenol. The pt had her vitals assessed and is currently seated in the waiting room in a chair for further orders.     Floridalma Anguiano, JIN     Rise Kicks  02/17/22 8924

## 2022-02-18 NOTE — ED PROVIDER NOTES
EMERGENCY DEPARTMENT ENCOUNTER      PCP: No primary care provider on file. CHIEF COMPLAINT    Chief Complaint   Patient presents with    Abdominal Pain     since 1700; RUQ & radiates around to back    Nausea & Vomiting     x 2; yellow in color       This patient was not evaluated by the attending physician. I have independently evaluated this patient. My supervising physician was available for consultation. HPI    Asim Galvez is a 28 y.o. female who presents with abdominal pain. Onset - 5pm  Location - RUQ  Duration - constant  Character - sharp, aching  Aggravating/Alleviating factors -no aggravating factors. Patient reports that initially a heating pad seemed to improve her pain but then it seemed to not help anymore. Associate symptoms -nausea, vomiting, urinary frequency  Severity - 10/10    Patient denies diarrhea, constipation, melena, hematochezia, hematemesis,  dysuria, urinary urgency, hematuria, vaginal symptoms. REVIEW OF SYSTEMS    Constitutional:  Denies fever, chills  HENT:  Denies sore throat or ear pain   Cardiovascular:  Denies chest pain, palpitations or swelling   Respiratory:  Denies cough or shortness of breath   GI:  See HPI above  : + Urinary frequency; no hematuria, urinary urgency, dysuria. No vaginal symptoms. Musculoskeletal:  Denies back pain or groin pain or masses. No pain or swelling of extremities. Skin:  Denies rash  Neurologic:  Denies headache, focal weakness or sensory changes   Endocrine:  Denies polyuria or polydypsia   Lymphatic:  Denies swollen glands     All other review of systems are negative  See HPI and nursing notes for additional information     PAST MEDICAL & SURGICAL HISTORY    Past Medical History:   Diagnosis Date    Gallbladder disease      History reviewed. No pertinent surgical history.     CURRENT MEDICATIONS    Current Outpatient Rx   Medication Sig Dispense Refill    ondansetron (ZOFRAN ODT) 4 MG disintegrating tablet Take 1 tablet by mouth every 8 hours as needed for Nausea or Vomiting 10 tablet 0    famotidine (PEPCID) 20 MG tablet Take 1 tablet by mouth 2 times daily 20 tablet 0    naproxen (NAPROSYN) 500 MG tablet Take 1 tablet by mouth 2 times daily 60 tablet 0    acetaminophen (TYLENOL) 325 MG tablet Take 2 tablets by mouth every 6 hours as needed for Pain 120 tablet 3    acetaminophen (APAP EXTRA STRENGTH) 500 MG tablet Take 1 tablet by mouth every 6 hours as needed for Pain 30 tablet 0    ibuprofen (ADVIL;MOTRIN) 600 MG tablet Take 1 tablet by mouth every 6 hours as needed for Pain 30 tablet 0    dicyclomine (BENTYL) 10 MG capsule Take 1 capsule by mouth 4 times daily (before meals and nightly) 30 capsule 3       ALLERGIES    No Known Allergies    SOCIAL AND FAMILY HISTORY    Social History     Socioeconomic History    Marital status:      Spouse name: None    Number of children: None    Years of education: None    Highest education level: None   Occupational History    None   Tobacco Use    Smoking status: Never Smoker    Smokeless tobacco: Never Used   Vaping Use    Vaping Use: Never used   Substance and Sexual Activity    Alcohol use: No    Drug use: No    Sexual activity: None   Other Topics Concern    None   Social History Narrative    None     Social Determinants of Health     Financial Resource Strain:     Difficulty of Paying Living Expenses: Not on file   Food Insecurity:     Worried About Running Out of Food in the Last Year: Not on file    Kelly of Food in the Last Year: Not on file   Transportation Needs:     Lack of Transportation (Medical): Not on file    Lack of Transportation (Non-Medical):  Not on file   Physical Activity:     Days of Exercise per Week: Not on file    Minutes of Exercise per Session: Not on file   Stress:     Feeling of Stress : Not on file   Social Connections:     Frequency of Communication with Friends and Family: Not on file    Frequency of Social Gatherings with Friends and Family: Not on file    Attends Yazidi Services: Not on file    Active Member of Clubs or Organizations: Not on file    Attends Club or Organization Meetings: Not on file    Marital Status: Not on file   Intimate Partner Violence:     Fear of Current or Ex-Partner: Not on file    Emotionally Abused: Not on file    Physically Abused: Not on file    Sexually Abused: Not on file   Housing Stability:     Unable to Pay for Housing in the Last Year: Not on file    Number of [place] in the Last Year: Not on file    Unstable Housing in the Last Year: Not on file     History reviewed. No pertinent family history. PHYSICAL EXAM    VITAL SIGNS: /80   Pulse 59   Temp 97.7 °F (36.5 °C) (Oral)   Resp 20   Ht 5' 9\" (1.753 m)   Wt 230 lb (104.3 kg)   LMP 01/30/2022 (Exact Date)   SpO2 100%   Breastfeeding No   BMI 33.97 kg/m²   Constitutional:  Well developed, well nourished. No distress  Eyes:  Sclera nonicteric, conjunctiva moist  HENT:  Atraumatic. EOMI. Moist mucus membranes. Neck/Lymphatics: supple, no JVD, no swollen nodes  Respiratory:  No retractions, no accessory muscle use, normal breath sounds   Cardiovascular:  normal rate, normal rhythm, no murmurs    GI:    No gross discoloration.       -no Thai's sign (periumbilical ecchymosis)       -no Grey-Coffey's sign (flank ecchymosis)    Bowel sounds present, no audible bruits. Soft, no distention, no guarding, no rigidity,   + RUQ abdominal tenderness to palpation-no other abdominal tenderness to palpation on exam, no rebound tenderness, no palpable pulsatile masses,   No McBurney's point tenderness   Negative Rovsing sign    Negative Salazar's sign. Back:  No CVA tenderness to percussion.   Musculoskeletal:  No edema, no deformity  Vascular: DP pulses 2+ equal bilaterally  Integument: No rash, dry skin  Neurologic:  Alert & oriented, normal speech  Psychiatric: Cooperative, pleasant affect       LABS:  Results for orders placed or performed during the hospital encounter of 02/18/22   CBC with Auto Differential   Result Value Ref Range    WBC 14.7 (H) 4.0 - 10.5 K/CU MM    RBC 5.32 4.2 - 5.4 M/CU MM    Hemoglobin 15.2 12.5 - 16.0 GM/DL    Hematocrit 45.1 37 - 47 %    MCV 84.8 78 - 100 FL    MCH 28.6 27 - 31 PG    MCHC 33.7 32.0 - 36.0 %    RDW 12.4 11.7 - 14.9 %    Platelets 507 796 - 593 K/CU MM    MPV 11.6 (H) 7.5 - 11.1 FL    Differential Type AUTOMATED DIFFERENTIAL     Segs Relative 84.9 (H) 36 - 66 %    Lymphocytes % 10.2 (L) 24 - 44 %    Monocytes % 4.0 0 - 4 %    Eosinophils % 0.2 0 - 3 %    Basophils % 0.3 0 - 1 %    Segs Absolute 12.5 K/CU MM    Lymphocytes Absolute 1.5 K/CU MM    Monocytes Absolute 0.6 K/CU MM    Eosinophils Absolute 0.0 K/CU MM    Basophils Absolute 0.0 K/CU MM    Nucleated RBC % 0.0 %    Total Nucleated RBC 0.0 K/CU MM    Total Immature Neutrophil 0.06 K/CU MM    Immature Neutrophil % 0.4 0 - 0.43 %   Comprehensive Metabolic Panel w/ Reflex to MG   Result Value Ref Range    Sodium 138 135 - 145 MMOL/L    Potassium 3.7 3.5 - 5.1 MMOL/L    Chloride 102 99 - 110 mMol/L    CO2 21 21 - 32 MMOL/L    BUN 10 6 - 23 MG/DL    CREATININE 0.6 0.6 - 1.1 MG/DL    Glucose 100 (H) 70 - 99 MG/DL    Calcium 9.7 8.3 - 10.6 MG/DL    Albumin 4.7 3.4 - 5.0 GM/DL    Total Protein 7.8 6.4 - 8.2 GM/DL    Total Bilirubin 0.5 0.0 - 1.0 MG/DL    ALT 21 10 - 40 U/L    AST 18 15 - 37 IU/L    Alkaline Phosphatase 67 40 - 128 IU/L    GFR Non-African American >60 >60 mL/min/1.73m2    GFR African American >60 >60 mL/min/1.73m2    Anion Gap 15 4 - 16   Lipase   Result Value Ref Range    Lipase 22 13 - 60 IU/L   HCG Serum, Quantitative   Result Value Ref Range    hCG Quant 0.5 UIU/ML   Urinalysis with Microscopic   Result Value Ref Range    Color, UA YELLOW YELLOW    Clarity, UA CLEAR CLEAR    Glucose, Urine NEGATIVE NEGATIVE MG/DL    Bilirubin Urine SMALL (A) NEGATIVE MG/DL    Ketones, Urine >80 (A) NEGATIVE MG/DL    Specific Gravity, UA >1.030 1.001 - 1.035    Blood, Urine NEGATIVE NEGATIVE    pH, Urine 5.0 5.0 - 8.0    Protein, UA NEGATIVE NEGATIVE MG/DL    Urobilinogen, Urine 1.0 0.2 - 1.0 MG/DL    Nitrite Urine, Quantitative NEGATIVE NEGATIVE    Leukocyte Esterase, Urine SMALL (A) NEGATIVE    RBC, UA NONE SEEN 0 - 6 /HPF    WBC, UA 1 0 - 5 /HPF    Bacteria, UA NEGATIVE NEGATIVE /HPF    Squam Epithel, UA 6 /HPF    Mucus, UA MANY (A) NEGATIVE HPF   ICTOTEST, URINE   Result Value Ref Range    Ictotest NEGATIVE            RADIOLOGY/PROCEDURES    US ABDOMEN LIMITED   Final Result   Limited study. Minimal sludge within the gallbladder. Gallbladder wall is   borderline thickened. There is a negative sonographic Salazar sign. There is   no pericholecystic fluid. Findings are not definitive for acute   cholecystitis. Hepatobiliary scintigraphy can be obtained if needed. ED COURSE & MEDICAL DECISION MAKING       Vital signs and nursing notes reviewed during ED course. I have independently evaluated this patient. Supervising physician present in the Emergency Department, available for consultation, throughout entirety of  patient care. Patient presents as above which prompted workup. While in the ED today, labs and RUQ US were obtained. Leukocytosis of 14.7. CMP without emergent findings. Lipase within normal limits- low clinical suspicion for pancreatitis. Quantitative serum HCG is 0.5- not pregnant. Urinalysis without evidence of UTI. Some ketones are present and I recommended continued oral hydration at home. Right upper quadrant ultrasound reveals minimal sludge within the gallbladder with gallbladder wall borderline thickened. Negative sonographic Salazar sign. No pericholecystic fluid. Initial abdominal exam and repeat abdominal exam are without peritoneal signs. Patient treated with IM toradol and Zofran ODT with complete resolution of symptoms.   Patient feels improved and is comfortable with discharge home.  Patient discharged home with prescription for Zofran. We discussed medication. Patient is nontoxic appearing. Vital signs stable. Patient stable for outpatient management and comfortable with discharge at this time. All pertinent Lab data and radiographic results reviewed with patient at bedside. The patient and/or the family were informed of the results of any tests/labs/imaging, the treatment plan, and time was allotted to answer questions. Diagnosis, disposition, and treatment plan reviewed in detail with patient. Patient understands and agrees to follow-up with [facility] for recheck in 1-2 days and with her general surgeon as needed as we discussed today. Patient understands and agrees to return to emergency department for any new or worsening symptoms - strict return precautions given. Clinical  IMPRESSION    1. Right upper quadrant abdominal pain    2. Non-intractable vomiting with nausea, unspecified vomiting type        Disposition referral (if applicable):  [facility]  [address] [address] [facility] [address]  Call today  Recheck in 1-2 days    Mery Rick MD  [facility] [address] [phone]    Call today  As needed    [facility] Emergency Department  [place] [id number] [phone]  Go to   Return to ED if symptoms worsen or new symptoms      Disposition medications (if applicable):  New Prescriptions    ONDANSETRON (ZOFRAN ODT) 4 MG DISINTEGRATING TABLET    Take 1 tablet by mouth every 8 hours as needed for Nausea or Vomiting         Comment: Please note this report has been produced using speech recognition software and may contain errors related to that system including errors in grammar, punctuation, and spelling, as well as words and phrases that may be inappropriate.  If there are any questions or concerns please feel free to contact the dictating provider for clarification.         Lesli Ortiz PA-C  02/18/22 2061

## 2022-02-18 NOTE — ED NOTES
D/c instructions reviewed [facility] pt. All questions answered. Pt a/o and d/c to home.       Waleska Castellanos RN  02/18/22 3755

## 2022-02-20 ENCOUNTER — APPOINTMENT (OUTPATIENT)
Dept: CT IMAGING | Age: 32
End: 2022-02-20
Payer: COMMERCIAL

## 2022-02-20 ENCOUNTER — HOSPITAL ENCOUNTER (OUTPATIENT)
Age: 32
Setting detail: OBSERVATION
Discharge: HOME | End: 2022-02-23
Attending: SURGERY | Admitting: SURGERY
Payer: COMMERCIAL

## 2022-02-20 ENCOUNTER — APPOINTMENT (OUTPATIENT)
Dept: ULTRASOUND IMAGING | Age: 32
End: 2022-02-20
Payer: COMMERCIAL

## 2022-02-20 DIAGNOSIS — K83.8 COMMON BILE DUCT DILATATION: ICD-10-CM

## 2022-02-20 DIAGNOSIS — N39.0 URINARY TRACT INFECTION WITHOUT HEMATURIA, SITE UNSPECIFIED: ICD-10-CM

## 2022-02-20 DIAGNOSIS — K81.0 ACUTE CHOLECYSTITIS: Primary | ICD-10-CM

## 2022-02-20 LAB
ALBUMIN SERPL-MCNC: 3.7 GM/DL (ref 3.4–5)
ALP BLD-CCNC: 75 IU/L (ref 40–129)
ALT SERPL-CCNC: 16 U/L (ref 10–40)
ANION GAP SERPL CALCULATED.3IONS-SCNC: 14 MMOL/L (ref 4–16)
AST SERPL-CCNC: 20 IU/L (ref 15–37)
BACTERIA: ABNORMAL /HPF
BASOPHILS ABSOLUTE: 0 K/CU MM
BASOPHILS RELATIVE PERCENT: 0.3 % (ref 0–1)
BILIRUB SERPL-MCNC: 0.7 MG/DL (ref 0–1)
BILIRUBIN URINE: ABNORMAL MG/DL
BLOOD, URINE: ABNORMAL
BUN BLDV-MCNC: 9 MG/DL (ref 6–23)
CALCIUM SERPL-MCNC: 8.8 MG/DL (ref 8.3–10.6)
CHLORIDE BLD-SCNC: 102 MMOL/L (ref 99–110)
CLARITY: ABNORMAL
CO2: 20 MMOL/L (ref 21–32)
COLOR: ABNORMAL
CREAT SERPL-MCNC: 0.5 MG/DL (ref 0.6–1.1)
DIFFERENTIAL TYPE: ABNORMAL
EOSINOPHILS ABSOLUTE: 0.1 K/CU MM
EOSINOPHILS RELATIVE PERCENT: 0.7 % (ref 0–3)
GFR AFRICAN AMERICAN: >60 ML/MIN/1.73M2
GFR NON-AFRICAN AMERICAN: >60 ML/MIN/1.73M2
GLUCOSE BLD-MCNC: 116 MG/DL (ref 70–99)
GLUCOSE, URINE: NEGATIVE MG/DL
HCG QUALITATIVE: NEGATIVE
HCT VFR BLD CALC: 43 % (ref 37–47)
HEMOGLOBIN: 14.1 GM/DL (ref 12.5–16)
ICTOTEST: NORMAL
IMMATURE NEUTROPHIL %: 0.3 % (ref 0–0.43)
KETONES, URINE: ABNORMAL MG/DL
LEUKOCYTE ESTERASE, URINE: ABNORMAL
LIPASE: 11 IU/L (ref 13–60)
LYMPHOCYTES ABSOLUTE: 1.7 K/CU MM
LYMPHOCYTES RELATIVE PERCENT: 14.2 % (ref 24–44)
MCH RBC QN AUTO: 28.8 PG (ref 27–31)
MCHC RBC AUTO-ENTMCNC: 32.8 % (ref 32–36)
MCV RBC AUTO: 87.8 FL (ref 78–100)
MONOCYTES ABSOLUTE: 1.5 K/CU MM
MONOCYTES RELATIVE PERCENT: 12.4 % (ref 0–4)
MUCUS: ABNORMAL HPF
NITRITE URINE, QUANTITATIVE: POSITIVE
NUCLEATED RBC %: 0 %
PDW BLD-RTO: 12.8 % (ref 11.7–14.9)
PH, URINE: 5.5 (ref 5–8)
PLATELET # BLD: 231 K/CU MM (ref 140–440)
PMV BLD AUTO: 11 FL (ref 7.5–11.1)
POTASSIUM SERPL-SCNC: 4.3 MMOL/L (ref 3.5–5.1)
PROTEIN UA: 100 MG/DL
RBC # BLD: 4.9 M/CU MM (ref 4.2–5.4)
RBC URINE: 7 /HPF (ref 0–6)
SEGMENTED NEUTROPHILS ABSOLUTE COUNT: 8.5 K/CU MM
SEGMENTED NEUTROPHILS RELATIVE PERCENT: 72.1 % (ref 36–66)
SODIUM BLD-SCNC: 136 MMOL/L (ref 135–145)
SPECIFIC GRAVITY UA: >1.03 (ref 1–1.03)
SQUAMOUS EPITHELIAL: 6 /HPF
TOTAL IMMATURE NEUTOROPHIL: 0.04 K/CU MM
TOTAL NUCLEATED RBC: 0 K/CU MM
TOTAL PROTEIN: 6.4 GM/DL (ref 6.4–8.2)
TRICHOMONAS: ABNORMAL /HPF
UROBILINOGEN, URINE: 1 MG/DL (ref 0.2–1)
WBC # BLD: 11.7 K/CU MM (ref 4–10.5)
WBC UA: 29 /HPF (ref 0–5)

## 2022-02-20 PROCEDURE — 96376 TX/PRO/DX INJ SAME DRUG ADON: CPT

## 2022-02-20 PROCEDURE — 96375 TX/PRO/DX INJ NEW DRUG ADDON: CPT

## 2022-02-20 PROCEDURE — 2580000003 HC RX 258: Performed by: SURGERY

## 2022-02-20 PROCEDURE — C9113 INJ PANTOPRAZOLE SODIUM, VIA: HCPCS | Performed by: SURGERY

## 2022-02-20 PROCEDURE — 99284 EMERGENCY DEPT VISIT MOD MDM: CPT

## 2022-02-20 PROCEDURE — 87086 URINE CULTURE/COLONY COUNT: CPT

## 2022-02-20 PROCEDURE — 83690 ASSAY OF LIPASE: CPT

## 2022-02-20 PROCEDURE — 85025 COMPLETE CBC W/AUTO DIFF WBC: CPT

## 2022-02-20 PROCEDURE — 80053 COMPREHEN METABOLIC PANEL: CPT

## 2022-02-20 PROCEDURE — 6360000002 HC RX W HCPCS: Performed by: SURGERY

## 2022-02-20 PROCEDURE — 84703 CHORIONIC GONADOTROPIN ASSAY: CPT

## 2022-02-20 PROCEDURE — G0378 HOSPITAL OBSERVATION PER HR: HCPCS

## 2022-02-20 PROCEDURE — 6360000002 HC RX W HCPCS: Performed by: PHYSICIAN ASSISTANT

## 2022-02-20 PROCEDURE — 6360000004 HC RX CONTRAST MEDICATION: Performed by: PHYSICIAN ASSISTANT

## 2022-02-20 PROCEDURE — 87186 SC STD MICRODIL/AGAR DIL: CPT

## 2022-02-20 PROCEDURE — 6370000000 HC RX 637 (ALT 250 FOR IP): Performed by: PHYSICIAN ASSISTANT

## 2022-02-20 PROCEDURE — 74177 CT ABD & PELVIS W/CONTRAST: CPT

## 2022-02-20 PROCEDURE — 87077 CULTURE AEROBIC IDENTIFY: CPT

## 2022-02-20 PROCEDURE — 96365 THER/PROPH/DIAG IV INF INIT: CPT

## 2022-02-20 PROCEDURE — 76705 ECHO EXAM OF ABDOMEN: CPT

## 2022-02-20 PROCEDURE — 2580000003 HC RX 258: Performed by: PHYSICIAN ASSISTANT

## 2022-02-20 PROCEDURE — 81001 URINALYSIS AUTO W/SCOPE: CPT

## 2022-02-20 RX ORDER — SODIUM CHLORIDE, SODIUM LACTATE, POTASSIUM CHLORIDE, CALCIUM CHLORIDE 600; 310; 30; 20 MG/100ML; MG/100ML; MG/100ML; MG/100ML
INJECTION, SOLUTION INTRAVENOUS CONTINUOUS
Status: DISCONTINUED | OUTPATIENT
Start: 2022-02-20 | End: 2022-02-22

## 2022-02-20 RX ORDER — MORPHINE SULFATE 2 MG/ML
2 INJECTION, SOLUTION INTRAMUSCULAR; INTRAVENOUS
Status: DISCONTINUED | OUTPATIENT
Start: 2022-02-20 | End: 2022-02-23 | Stop reason: HOSPADM

## 2022-02-20 RX ORDER — ONDANSETRON 2 MG/ML
4 INJECTION INTRAMUSCULAR; INTRAVENOUS EVERY 4 HOURS PRN
Status: DISCONTINUED | OUTPATIENT
Start: 2022-02-20 | End: 2022-02-23 | Stop reason: HOSPADM

## 2022-02-20 RX ORDER — HYDROCODONE BITARTRATE AND ACETAMINOPHEN 5; 325 MG/1; MG/1
1 TABLET ORAL ONCE
Status: COMPLETED | OUTPATIENT
Start: 2022-02-20 | End: 2022-02-20

## 2022-02-20 RX ORDER — PANTOPRAZOLE SODIUM 40 MG/10ML
40 INJECTION, POWDER, LYOPHILIZED, FOR SOLUTION INTRAVENOUS DAILY
Status: DISCONTINUED | OUTPATIENT
Start: 2022-02-20 | End: 2022-02-22

## 2022-02-20 RX ORDER — MORPHINE SULFATE 4 MG/ML
4 INJECTION, SOLUTION INTRAMUSCULAR; INTRAVENOUS
Status: DISCONTINUED | OUTPATIENT
Start: 2022-02-20 | End: 2022-02-23 | Stop reason: HOSPADM

## 2022-02-20 RX ORDER — MORPHINE SULFATE 2 MG/ML
4 INJECTION, SOLUTION INTRAMUSCULAR; INTRAVENOUS ONCE
Status: COMPLETED | OUTPATIENT
Start: 2022-02-20 | End: 2022-02-20

## 2022-02-20 RX ORDER — ONDANSETRON 2 MG/ML
4 INJECTION INTRAMUSCULAR; INTRAVENOUS ONCE
Status: COMPLETED | OUTPATIENT
Start: 2022-02-20 | End: 2022-02-20

## 2022-02-20 RX ADMIN — SODIUM CHLORIDE, POTASSIUM CHLORIDE, SODIUM LACTATE AND CALCIUM CHLORIDE: 600; 310; 30; 20 INJECTION, SOLUTION INTRAVENOUS at 21:27

## 2022-02-20 RX ADMIN — HYDROCODONE BITARTRATE AND ACETAMINOPHEN 1 TABLET: 5; 325 TABLET ORAL at 14:34

## 2022-02-20 RX ADMIN — ONDANSETRON 4 MG: 2 INJECTION INTRAMUSCULAR; INTRAVENOUS at 19:39

## 2022-02-20 RX ADMIN — PIPERACILLIN AND TAZOBACTAM 4500 MG: 4; .5 INJECTION, POWDER, FOR SOLUTION INTRAVENOUS at 19:46

## 2022-02-20 RX ADMIN — IOPAMIDOL 75 ML: 755 INJECTION, SOLUTION INTRAVENOUS at 17:19

## 2022-02-20 RX ADMIN — MORPHINE SULFATE 4 MG: 2 INJECTION, SOLUTION INTRAMUSCULAR; INTRAVENOUS at 19:39

## 2022-02-20 RX ADMIN — MORPHINE SULFATE 4 MG: 4 INJECTION, SOLUTION INTRAMUSCULAR; INTRAVENOUS at 22:34

## 2022-02-20 RX ADMIN — PANTOPRAZOLE SODIUM 40 MG: 40 INJECTION, POWDER, FOR SOLUTION INTRAVENOUS at 21:30

## 2022-02-20 ASSESSMENT — PAIN SCALES - GENERAL
PAINLEVEL_OUTOF10: 8
PAINLEVEL_OUTOF10: 9
PAINLEVEL_OUTOF10: 8
PAINLEVEL_OUTOF10: 9
PAINLEVEL_OUTOF10: 8

## 2022-02-20 ASSESSMENT — PAIN DESCRIPTION - FREQUENCY
FREQUENCY: CONTINUOUS
FREQUENCY: CONTINUOUS

## 2022-02-20 ASSESSMENT — PAIN DESCRIPTION - LOCATION
LOCATION: ABDOMEN
LOCATION: ABDOMEN

## 2022-02-20 ASSESSMENT — PAIN DESCRIPTION - PAIN TYPE
TYPE: ACUTE PAIN
TYPE: ACUTE PAIN

## 2022-02-20 ASSESSMENT — PAIN DESCRIPTION - DESCRIPTORS
DESCRIPTORS: SHARP
DESCRIPTORS: SHARP

## 2022-02-20 ASSESSMENT — PAIN DESCRIPTION - ORIENTATION: ORIENTATION: RIGHT

## 2022-02-20 NOTE — ED NOTES
Bed: ED-31  Expected date:   Expected time:   Means of arrival:   Comments:  Mayco Neri 19, RN  02/20/22 8292

## 2022-02-20 NOTE — ED PROVIDER NOTES
EMERGENCY DEPARTMENT ENCOUNTER      PCP: No primary care provider on file. CHIEF COMPLAINT    Chief Complaint   Patient presents with    Abdominal Pain     reports possible gallbladder problem       This patient was not evaluated by the attending physician. I have independently evaluated this patient. HPI    Rod Montilla is a 28 y.o. female who presents with right upper quadrant abdominal pain and flank pain. Onset 3 days ago. Patient states she was evaluated emergency department this time and told she had gallstones and sludge. Patient states pain worsens with certain movements. No known alleviating factors. Patient denies any current vomiting or diarrhea. Patient has fever, chest pain or shortness of breath. Patient denies diarrhea, fever, urinary symptoms. REVIEW OF SYSTEMS    Constitutional:  Denies fever   HENT:  Denies sore throat or ear pain   Cardiovascular:  Denies chest pain  Respiratory:  Denies cough or shortness of breath   GI:  See HPI above  : No hematuria or dysuria. Musculoskeletal: see HPI No pain or swelling of extremities. Skin:  Denies rash  Neurologic:  Denies focal weakness or sensory changes   Lymphatic:  Denies swollen glands     All other review of systems are negative  See HPI and nursing notes for additional information     PAST MEDICAL & SURGICAL HISTORY    Past Medical History:   Diagnosis Date    Gallbladder disease      No past surgical history on file.     CURRENT MEDICATIONS    Current Outpatient Rx   Medication Sig Dispense Refill    ondansetron (ZOFRAN ODT) 4 MG disintegrating tablet Take 1 tablet by mouth every 8 hours as needed for Nausea or Vomiting 10 tablet 0    famotidine (PEPCID) 20 MG tablet Take 1 tablet by mouth 2 times daily 20 tablet 0    naproxen (NAPROSYN) 500 MG tablet Take 1 tablet by mouth 2 times daily 60 tablet 0    acetaminophen (TYLENOL) 325 MG tablet Take 2 tablets by mouth every 6 hours as needed for Pain 120 tablet 3    acetaminophen (APAP EXTRA STRENGTH) 500 MG tablet Take 1 tablet by mouth every 6 hours as needed for Pain 30 tablet 0    ibuprofen (ADVIL;MOTRIN) 600 MG tablet Take 1 tablet by mouth every 6 hours as needed for Pain 30 tablet 0    dicyclomine (BENTYL) 10 MG capsule Take 1 capsule by mouth 4 times daily (before meals and nightly) 30 capsule 3       ALLERGIES    No Known Allergies    SOCIAL AND FAMILY HISTORY    Social History     Socioeconomic History    Marital status:      Spouse name: Not on file    Number of children: Not on file    Years of education: Not on file    Highest education level: Not on file   Occupational History    Not on file   Tobacco Use    Smoking status: Never Smoker    Smokeless tobacco: Never Used   Vaping Use    Vaping Use: Never used   Substance and Sexual Activity    Alcohol use: No    Drug use: No    Sexual activity: Not on file   Other Topics Concern    Not on file   Social History Narrative    Not on file     Social Determinants of Health     Financial Resource Strain:     Difficulty of Paying Living Expenses: Not on file   Food Insecurity:     Worried About Running Out of Food in the Last Year: Not on file    Kelly of Food in the Last Year: Not on file   Transportation Needs:     Lack of Transportation (Medical): Not on file    Lack of Transportation (Non-Medical):  Not on file   Physical Activity:     Days of Exercise per Week: Not on file    Minutes of Exercise per Session: Not on file   Stress:     Feeling of Stress : Not on file   Social Connections:     Frequency of Communication with Friends and Family: Not on file    Frequency of Social Gatherings with Friends and Family: Not on file    Attends Mosque Services: Not on file    Active Member of Clubs or Organizations: Not on file    Attends Club or Organization Meetings: Not on file    Marital Status: Not on file   Intimate Partner Violence:     Fear of Current or Ex-Partner: Not on file  Emotionally Abused: Not on file    Physically Abused: Not on file    Sexually Abused: Not on file   Housing Stability:     Unable to Pay for Housing in the Last Year: Not on file    Number of Places Lived in the Last Year: Not on file    Unstable Housing in the Last Year: Not on file     No family history on file. PHYSICAL EXAM    VITAL SIGNS: /62   Pulse 89   Temp 98.3 °F (36.8 °C) (Oral)   Resp 16   Ht 5' 9\" (1.753 m)   Wt 230 lb (104.3 kg)   LMP 01/30/2022 (Exact Date)   SpO2 99%   BMI 33.97 kg/m²   Constitutional:  Well developed, well nourished  Eyes:  Sclera nonicteric, conjunctiva moist  HENT:  Atraumatic. PERRL. Neck/Lymphatics: supple, no JVD, no swollen nodes  Respiratory:  No retractions, no accessory muscle use, normal breath sounds   Cardiovascular:   normal rate, normal rhythm  GI:     No gross discoloration. Bowel sounds present, no audible bruits. Soft,  no guarding,   + Moderate right upper quadrant abdominal tenderness, no rebound, no palpable pulsatile masses   No McBurney's point tenderness   Negative Rovsing sign   + Salazar's sign. Back:   No CVA tenderness to percussion.   Musculoskeletal:  No edema, no deformity  Integument: No rash, dry skin  Neurologic:  Alert & oriented, normal speech  Psychiatric: Cooperative, pleasant affect       LABS:  Results for orders placed or performed during the hospital encounter of 02/20/22   Comprehensive Metabolic Panel   Result Value Ref Range    Sodium 136 135 - 145 MMOL/L    Potassium 4.3 3.5 - 5.1 MMOL/L    Chloride 102 99 - 110 mMol/L    CO2 20 (L) 21 - 32 MMOL/L    BUN 9 6 - 23 MG/DL    CREATININE 0.5 (L) 0.6 - 1.1 MG/DL    Glucose 116 (H) 70 - 99 MG/DL    Calcium 8.8 8.3 - 10.6 MG/DL    Albumin 3.7 3.4 - 5.0 GM/DL    Total Protein 6.4 6.4 - 8.2 GM/DL    Total Bilirubin 0.7 0.0 - 1.0 MG/DL    ALT 16 10 - 40 U/L    AST 20 15 - 37 IU/L    Alkaline Phosphatase 75 40 - 129 IU/L    GFR Non-African American >60 >60 mL/min/1.73m2 GFR African American >60 >60 mL/min/1.73m2    Anion Gap 14 4 - 16   Lipase   Result Value Ref Range    Lipase 11 (L) 13 - 60 IU/L   Urinalysis   Result Value Ref Range    Color, UA BROWN YELLOW    Clarity, UA TURBID (A) CLEAR    Glucose, Urine NEGATIVE NEGATIVE MG/DL    Bilirubin Urine MODERATE (A) NEGATIVE MG/DL    Ketones, Urine SMALL (A) NEGATIVE MG/DL    Specific Gravity, UA >1.030 1.001 - 1.035    Blood, Urine TRACE (A) NEGATIVE    pH, Urine 5.5 5.0 - 8.0    Protein,  (A) NEGATIVE MG/DL    Urobilinogen, Urine 1.0 0.2 - 1.0 MG/DL    Nitrite Urine, Quantitative POSITIVE (A) NEGATIVE    Leukocyte Esterase, Urine TRACE (A) NEGATIVE    RBC, UA 7 (H) 0 - 6 /HPF    WBC, UA 29 (H) 0 - 5 /HPF    Bacteria, UA MANY (A) NEGATIVE /HPF    Squam Epithel, UA 6 /HPF    Mucus, UA MANY (A) NEGATIVE HPF    Trichomonas, UA NONE SEEN NONE SEEN /HPF   HCG Serum, Qualitative   Result Value Ref Range    hCG Qual NEGATIVE    ICTOTEST, URINE   Result Value Ref Range    Ictotest TRACE    CBC with Auto Differential   Result Value Ref Range    WBC 11.7 (H) 4.0 - 10.5 K/CU MM    RBC 4.90 4.2 - 5.4 M/CU MM    Hemoglobin 14.1 12.5 - 16.0 GM/DL    Hematocrit 43.0 37 - 47 %    MCV 87.8 78 - 100 FL    MCH 28.8 27 - 31 PG    MCHC 32.8 32.0 - 36.0 %    RDW 12.8 11.7 - 14.9 %    Platelets 886 885 - 949 K/CU MM    MPV 11.0 7.5 - 11.1 FL    Differential Type AUTOMATED DIFFERENTIAL     Segs Relative 72.1 (H) 36 - 66 %    Lymphocytes % 14.2 (L) 24 - 44 %    Monocytes % 12.4 (H) 0 - 4 %    Eosinophils % 0.7 0 - 3 %    Basophils % 0.3 0 - 1 %    Segs Absolute 8.5 K/CU MM    Lymphocytes Absolute 1.7 K/CU MM    Monocytes Absolute 1.5 K/CU MM    Eosinophils Absolute 0.1 K/CU MM    Basophils Absolute 0.0 K/CU MM    Nucleated RBC % 0.0 %    Total Nucleated RBC 0.0 K/CU MM    Total Immature Neutrophil 0.04 K/CU MM    Immature Neutrophil % 0.3 0 - 0.43 %           RADIOLOGY/PROCEDURES    CT ABDOMEN PELVIS W IV CONTRAST Additional Contrast? None   Final Result   CT findings consistent with acute cholecystitis. Dilated CBD, of uncertain etiology. No radiopaque obstructing biliary stone   identified. Complex pelvic free fluid with suggestion of a ruptured cyst within the right   ovary. US ABDOMEN LIMITED   Preliminary Result   1. Gallbladder sludge with mild gallbladder wall edema. No sonographic   Salazar's sign. If there is continued concern for acute cholecystitis, HIDA   scan could be considered. 2. Borderline dilation of the common bile duct up to 8 mm is nonspecific   given the patient's normal liver function tests today. ED COURSE & MEDICAL DECISION MAKING      Presents as above. Patient provided [place] for pain. CBC shows elevated white blood cell count 11.7. CMP shows CO2 of 20 otherwise within normal limits. Lipase 11. Pregnancy negative. Urinalysis with positive nitrites, trace leukocytes, 29 white blood cells and many bacteria. Urine sent for culture. Abdominal ultrasound shows gallbladder sludge with mild gallbladder wall edema, borderline dilation of common bile duct up to 8 mm. CT abdomen pelvis with IV contrast ordered for further evaluation which shows CT findings consistent with acute cholecystitis, dilated common bile duct, no radiopaque obstructing biliary stone identified, complex pelvic free fluid with suggestion of ruptured cyst within the right ovary. Patient has seen general surgeon Dr. Guerrero Garcia previously, consult to general surgery and discussed case with general surgeon Dr. Royal Carbajal covering for Dr. Guerrero Garcia who agrees with plan for admission, requests Zosyn and she will admit. Clinical  IMPRESSION    1. Acute cholecystitis    2. Common bile duct dilatation    3.  Urinary tract infection without hematuria, site unspecified        Patient admitted to surgery    Comment: Please note this report has been produced using speech recognition software and may contain errors related to that system including errors in grammar, punctuation, and spelling, as well as words and phrases that may be inappropriate. If there are any questions or concerns please feel free to contact the dictating provider for clarification.        Orly De La Rosa PA-C  02/20/22 4205

## 2022-02-20 NOTE — ED NOTES
FTF report given to [place] for change of assignment and assumption of care.        Esther Barrios, MIMI  02/20/22 0967

## 2022-02-21 ENCOUNTER — ANESTHESIA EVENT (OUTPATIENT)
Dept: OPERATING ROOM | Age: 32
End: 2022-02-21
Payer: COMMERCIAL

## 2022-02-21 LAB
ALBUMIN SERPL-MCNC: 3.5 GM/DL (ref 3.4–5)
ALP BLD-CCNC: 71 IU/L (ref 40–129)
ALT SERPL-CCNC: 14 U/L (ref 10–40)
ANION GAP SERPL CALCULATED.3IONS-SCNC: 11 MMOL/L (ref 4–16)
AST SERPL-CCNC: 12 IU/L (ref 15–37)
BILIRUB SERPL-MCNC: 0.8 MG/DL (ref 0–1)
BILIRUBIN DIRECT: 0.3 MG/DL (ref 0–0.3)
BILIRUBIN, INDIRECT: 0.5 MG/DL (ref 0–0.7)
BUN BLDV-MCNC: 5 MG/DL (ref 6–23)
CALCIUM SERPL-MCNC: 8.4 MG/DL (ref 8.3–10.6)
CHLORIDE BLD-SCNC: 100 MMOL/L (ref 99–110)
CO2: 25 MMOL/L (ref 21–32)
CREAT SERPL-MCNC: 0.6 MG/DL (ref 0.6–1.1)
GFR AFRICAN AMERICAN: >60 ML/MIN/1.73M2
GFR NON-AFRICAN AMERICAN: >60 ML/MIN/1.73M2
GLUCOSE BLD-MCNC: 85 MG/DL (ref 70–99)
HCT VFR BLD CALC: 38.5 % (ref 37–47)
HEMOGLOBIN: 12.3 GM/DL (ref 12.5–16)
LIPASE: 11 IU/L (ref 13–60)
MCH RBC QN AUTO: 28.4 PG (ref 27–31)
MCHC RBC AUTO-ENTMCNC: 31.9 % (ref 32–36)
MCV RBC AUTO: 88.9 FL (ref 78–100)
PDW BLD-RTO: 13 % (ref 11.7–14.9)
PLATELET # BLD: 220 K/CU MM (ref 140–440)
PMV BLD AUTO: 11.5 FL (ref 7.5–11.1)
POTASSIUM SERPL-SCNC: 3.8 MMOL/L (ref 3.5–5.1)
RBC # BLD: 4.33 M/CU MM (ref 4.2–5.4)
SODIUM BLD-SCNC: 136 MMOL/L (ref 135–145)
TOTAL PROTEIN: 6 GM/DL (ref 6.4–8.2)
WBC # BLD: 8.7 K/CU MM (ref 4–10.5)

## 2022-02-21 PROCEDURE — 36415 COLL VENOUS BLD VENIPUNCTURE: CPT

## 2022-02-21 PROCEDURE — 6360000002 HC RX W HCPCS: Performed by: SURGERY

## 2022-02-21 PROCEDURE — 80076 HEPATIC FUNCTION PANEL: CPT

## 2022-02-21 PROCEDURE — 85027 COMPLETE CBC AUTOMATED: CPT

## 2022-02-21 PROCEDURE — 83690 ASSAY OF LIPASE: CPT

## 2022-02-21 PROCEDURE — 6370000000 HC RX 637 (ALT 250 FOR IP): Performed by: SURGERY

## 2022-02-21 PROCEDURE — 2580000003 HC RX 258: Performed by: SURGERY

## 2022-02-21 PROCEDURE — 96376 TX/PRO/DX INJ SAME DRUG ADON: CPT

## 2022-02-21 PROCEDURE — G0378 HOSPITAL OBSERVATION PER HR: HCPCS

## 2022-02-21 PROCEDURE — 96366 THER/PROPH/DIAG IV INF ADDON: CPT

## 2022-02-21 PROCEDURE — 96375 TX/PRO/DX INJ NEW DRUG ADDON: CPT

## 2022-02-21 PROCEDURE — 82248 BILIRUBIN DIRECT: CPT

## 2022-02-21 PROCEDURE — C9113 INJ PANTOPRAZOLE SODIUM, VIA: HCPCS | Performed by: SURGERY

## 2022-02-21 PROCEDURE — 80053 COMPREHEN METABOLIC PANEL: CPT

## 2022-02-21 RX ORDER — ACETAMINOPHEN 325 MG/1
650 TABLET ORAL EVERY 4 HOURS PRN
Status: DISCONTINUED | OUTPATIENT
Start: 2022-02-21 | End: 2022-02-23 | Stop reason: HOSPADM

## 2022-02-21 RX ADMIN — SODIUM CHLORIDE, POTASSIUM CHLORIDE, SODIUM LACTATE AND CALCIUM CHLORIDE: 600; 310; 30; 20 INJECTION, SOLUTION INTRAVENOUS at 06:14

## 2022-02-21 RX ADMIN — MORPHINE SULFATE 2 MG: 2 INJECTION, SOLUTION INTRAMUSCULAR; INTRAVENOUS at 03:16

## 2022-02-21 RX ADMIN — PIPERACILLIN AND TAZOBACTAM 3375 MG: 3; .375 INJECTION, POWDER, LYOPHILIZED, FOR SOLUTION INTRAVENOUS at 11:38

## 2022-02-21 RX ADMIN — ACETAMINOPHEN 650 MG: 325 TABLET ORAL at 16:54

## 2022-02-21 RX ADMIN — PIPERACILLIN AND TAZOBACTAM 3375 MG: 3; .375 INJECTION, POWDER, LYOPHILIZED, FOR SOLUTION INTRAVENOUS at 18:48

## 2022-02-21 RX ADMIN — MORPHINE SULFATE 2 MG: 2 INJECTION, SOLUTION INTRAMUSCULAR; INTRAVENOUS at 11:32

## 2022-02-21 RX ADMIN — PANTOPRAZOLE SODIUM 40 MG: 40 INJECTION, POWDER, FOR SOLUTION INTRAVENOUS at 11:32

## 2022-02-21 RX ADMIN — PIPERACILLIN AND TAZOBACTAM 3375 MG: 3; .375 INJECTION, POWDER, LYOPHILIZED, FOR SOLUTION INTRAVENOUS at 03:11

## 2022-02-21 RX ADMIN — MORPHINE SULFATE 4 MG: 4 INJECTION, SOLUTION INTRAMUSCULAR; INTRAVENOUS at 21:25

## 2022-02-21 ASSESSMENT — PAIN SCALES - GENERAL
PAINLEVEL_OUTOF10: 8
PAINLEVEL_OUTOF10: 6
PAINLEVEL_OUTOF10: 4
PAINLEVEL_OUTOF10: 0

## 2022-02-21 ASSESSMENT — PAIN DESCRIPTION - FREQUENCY: FREQUENCY: CONTINUOUS

## 2022-02-21 ASSESSMENT — PAIN DESCRIPTION - LOCATION: LOCATION: ABDOMEN

## 2022-02-21 ASSESSMENT — PAIN DESCRIPTION - ORIENTATION: ORIENTATION: RIGHT

## 2022-02-21 ASSESSMENT — PAIN DESCRIPTION - DESCRIPTORS: DESCRIPTORS: SHARP

## 2022-02-21 NOTE — H&P
History and Physical    Patient's Name/Date of Birth: Mariela Burris / 1990 (28 y.o.)    Date: February 21, 2022     Chief Complaint: Abdominal pain and gallstones    HPI:  52-year-old female presents with upper abdominal pain right sided and back pain associated with gallstones consistent with cholecystitis. This patient was seen in the emergency room prior to the weekend worked up and discharged home but came back with recurrent symptoms. The patient's liver function tests were normal she does have gallstones and sludge within the gallbladder. I had seen the patient in consultation in 2015 for choledocholithiasis. The patient had a ERCP and stone extraction was recommended to the patient to follow-up and see me in the office to schedule elective laparoscopic cholecystectomy. The patient did not follow-up. Presently she is in no distress her pain is controlled with pain medication she is not been jaundiced has had no fever or chills. Past Medical History:   Diagnosis Date    Gallbladder disease        No past surgical history on file.     Current Facility-Administered Medications   Medication Dose Route Frequency Provider Last Rate Last Admin    ondansetron (ZOFRAN) injection 4 mg  4 mg IntraVENous Q4H PRN Bethany Galindo MD        morphine injection 2 mg  2 mg IntraVENous Q2H PRN Bethany Galindo MD   2 mg at 02/21/22 1132    morphine injection 4 mg  4 mg IntraVENous Q2H PRN Bethany Galindo MD   4 mg at 02/20/22 2234    piperacillin-tazobactam (ZOSYN) 3,375 mg in dextrose 5 % 50 mL IVPB (mini-bag)  3,375 mg IntraVENous Q8H Bethany Galindo MD 12.5 mL/hr at 02/21/22 1138 3,375 mg at 02/21/22 1138    pantoprazole (PROTONIX) injection 40 mg  40 mg IntraVENous Daily Bethany Galindo MD   40 mg at 02/21/22 1132    lactated ringers infusion   IntraVENous Continuous Bethany Galindo  mL/hr at 02/21/22 4370 New Bag at 02/21/22 1626       No Known Allergies    No family history on file. Social History     Socioeconomic History    Marital status:      Spouse name: Not on file    Number of children: Not on file    Years of education: Not on file    Highest education level: Not on file   Occupational History    Not on file   Tobacco Use    Smoking status: Never Smoker    Smokeless tobacco: Never Used   Vaping Use    Vaping Use: Never used   Substance and Sexual Activity    Alcohol use: No    Drug use: No    Sexual activity: Not on file   Other Topics Concern    Not on file   Social History Narrative    Not on file     Social Determinants of Health     Financial Resource Strain:     Difficulty of Paying Living Expenses: Not on file   Food Insecurity:     Worried About [address] [employer] in the Last Year: Not on file    Kelly of Food in the Last Year: Not on file   Transportation Needs:     Lack of Transportation (Medical): Not on file    Lack of Transportation (Non-Medical):  Not on file   Physical Activity:     Days of Exercise per Week: Not on file    Minutes of Exercise per Session: Not on file   Stress:     Feeling of Stress : Not on file   Social Connections:     Frequency of Communication with Friends and Family: Not on file    Frequency of Social Gatherings with Friends and Family: Not on file    Attends [employer] Services: Not on file    Active Member of [address] [place] [address] [employer] or Organizations: Not on file    Attends Club or Organization Meetings: Not on file    Marital Status: Not on file   Intimate Partner Violence:     Fear of Current or Ex-Partner: Not on file    Emotionally Abused: Not on file    Physically Abused: Not on file    Sexually Abused: Not on file   Housing Stability:     Unable to Pay for Housing in the Last Year: Not on file    Number of [place] in the Last Year: Not on file    Unstable Housing in the Last Year: Not on file       ROS: Non-contributory    Physical Exam:  Vitals:    02/21/22 1132   BP: 125/72   Pulse: 73   Resp: 16   Temp: 98.2 °F (36.8 °C)   SpO2: 95%       Mental Status: Alert and Oriented    Chest: Breath sounds were clear and equal with no rales, wheezes, or rhonchi. Respiratory effort was normal with no retractions or use of accessory muscles. Cardiovascular: Heart sounds were normal with a regular rate and rhythm. There were no murmurs or gallops. Abdomen: Obese bowel sounds were normal.  The abdomen was soft and non distended. There was right upper quadrant tenderness,  No guarding, rebound, or rigidity. There was no masses, hepatosplenomegaly, or hernias. Genitourinary: No inguinal hernias were noted on coughing and straining. Labs   Bmp:    Lab Results   Component Value Date     02/21/2022    K 3.8 02/21/2022     02/21/2022    CO2 25 02/21/2022    BUN 5 02/21/2022    CREATININE 0.6 02/21/2022    CALCIUM 8.4 02/21/2022     CBC:    Lab Results   Component Value Date    WBC 8.7 02/21/2022    RBC 4.33 02/21/2022    HGB 12.3 02/21/2022    HCT 38.5 02/21/2022    MCV 88.9 02/21/2022    MCH 28.4 02/21/2022    MCHC 31.9 02/21/2022    RDW 13.0 02/21/2022     02/21/2022    MPV 11.5 02/21/2022            Assessment/Plan:  Calculus cholecystitis without evidence of choledocholithiasis or pancreatitis. I have educated this patient again regarding the nature of her symptoms and I recommended laparoscopic cholecystectomy. The patient has received antibiotics pain medication IV fluids. She will be scheduled for laparoscopic cholecystectomy tomorrow and possibly discharge home after surgery. The risks the benefits have been fully explained. Statement Of Medical Necessity:  Surgical Intervention required to alleviate patients symptoms as described above. Electronically by Tor Cabrera MD  on 2/21/2022 at 1:29 PM     I have fully discussed the plan of treatment and answered all questions for the patient.

## 2022-02-21 NOTE — PROGRESS NOTES
4 Eyes Skin Assessment     NAME:  Sydnee Ayon  YOB: 1990  MEDICAL RECORD NUMBER:  [id number]    The patient is being assess for  Admission    I agree that 2 RN's have performed a thorough Head to Toe Skin Assessment on the patient. ALL assessment sites listed below have been assessed. Areas assessed by both nurses:    Head, Face, Ears, Shoulders, Back, Chest, Arms, Elbows, Hands, Sacrum. Buttock, Coccyx, Ischium and Legs. Feet and Heels        Does the Patient have a Wound? No noted wound(s)       [place] Prevention initiated:  NA   Wound Care Orders initiated:  NA    Pressure Injury (Stage 3,4, Unstageable, DTI, NWPT, and Complex wounds) if present place consult order under [de-identified] NA    New and Established Ostomies if present place consult order under : NA      Nurse 1 eSignature: Electronically signed by Cam Medina RN on 2/21/22 at 12:28 AM EST    **SHARE this note so that the co-signing nurse is able to place an eSignature**    Nurse 2 eSignature: Electronically signed by Joanne Palm.  FELICITA Hamilton, RN on 2/21/22 at 3:35 AM EST

## 2022-02-21 NOTE — CARE COORDINATION
Chart reviewed. Pt is from home and independent of ADLs. Pt has rx coverage. Pt does not have a PCP listed. PCP list added to DC instructions. CM will continue to follow for any DC needs that may arise.

## 2022-02-21 NOTE — ANESTHESIA PRE PROCEDURE
Department of Anesthesiology  Preprocedure Note       Name:  Cheryl Spear   Age:  28 y.o.  :  1990                                          MRN:  [id number]         Date:  2022      Surgeon: Swathi Chapman):  Leslie Lobato MD    Procedure: Procedure(s):  CHOLECYSTECTOMY LAPAROSCOPIC    Medications prior to admission:   Prior to Admission medications    Medication Sig Start Date End Date Taking?  Authorizing Provider   ondansetron (ZOFRAN ODT) 4 MG disintegrating tablet Take 1 tablet by mouth every 8 hours as needed for Nausea or Vomiting 22   Linda Medel PA-C   famotidine (PEPCID) 20 MG tablet Take 1 tablet by mouth 2 times daily 20   OBED Lr   naproxen (NAPROSYN) 500 MG tablet Take 1 tablet by mouth 2 times daily 3/8/19   Pinkey Symone, DO   acetaminophen (TYLENOL) 325 MG tablet Take 2 tablets by mouth every 6 hours as needed for Pain 3/8/19   Pinkey Symone, DO   acetaminophen (APAP EXTRA STRENGTH) 500 MG tablet Take 1 tablet by mouth every 6 hours as needed for Pain 3/6/19   OBED Lr   ibuprofen (ADVIL;MOTRIN) 600 MG tablet Take 1 tablet by mouth every 6 hours as needed for Pain 3/6/19   OBED Lr   dicyclomine (BENTYL) 10 MG capsule Take 1 capsule by mouth 4 times daily (before meals and nightly) 9/2/15   Manju Guerrero PA-C       Current medications:    Current Facility-Administered Medications   Medication Dose Route Frequency Provider Last Rate Last Admin    ondansetron (ZOFRAN) injection 4 mg  4 mg IntraVENous Q4H PRN Willie Arevalo MD        morphine injection 2 mg  2 mg IntraVENous Q2H PRN Willie Arevalo MD   2 mg at 22 1132    morphine injection 4 mg  4 mg IntraVENous Q2H PRN Willie Arevalo MD   4 mg at 22 2234    piperacillin-tazobactam (ZOSYN) 3,375 mg in dextrose 5 % 50 mL IVPB (mini-bag)  3,375 mg IntraVENous Diane Cornell MD 12.5 mL/hr at 22 1138 3,375 mg at 22 1138    pantoprazole (PROTONIX) injection 40 mg  40 mg IntraVENous Daily Puja Pandey MD   40 mg at 02/21/22 1132    lactated ringers infusion   IntraVENous Continuous Puja Pandey  mL/hr at 02/21/22 3750 New Bag at 02/21/22 0614       Allergies:  No Known Allergies    Problem List:    Patient Active Problem List   Diagnosis Code    Calculus of bile duct without cholecystitis with obstruction K80.51    Acute cholecystitis K81.0       Past Medical History:        Diagnosis Date    Gallbladder disease        Past Surgical History:  No past surgical history on file. Social History:    Social History     Tobacco Use    Smoking status: Never Smoker    Smokeless tobacco: Never Used   Substance Use Topics    Alcohol use: No                                Counseling given: Not Answered      Vital Signs (Current):   Vitals:    02/20/22 2230 02/21/22 0313 02/21/22 1132 02/21/22 1330   BP: 129/73 114/61 125/72 117/67   Pulse: 73 84 73 76   Resp: 16 16 16 18   Temp: 98.6 °F (37 °C) 99.1 °F (37.3 °C) 98.2 °F (36.8 °C) 99.5 °F (37.5 °C)   TempSrc: Oral Oral Oral Oral   SpO2: 98% 93% 95% 96%   Weight:       Height:                                                  BP Readings from Last 3 Encounters:   02/21/22 117/67   02/18/22 132/75   08/02/20 94/60       NPO Status:                                                                                 BMI:   Wt Readings from Last 3 Encounters:   02/20/22 230 lb (104.3 kg)   02/17/22 230 lb (104.3 kg)   08/02/20 210 lb (95.3 kg)     Body mass index is 33.97 kg/m².     CBC:   Lab Results   Component Value Date    WBC 8.7 02/21/2022    RBC 4.33 02/21/2022    HGB 12.3 02/21/2022    HCT 38.5 02/21/2022    MCV 88.9 02/21/2022    RDW 13.0 02/21/2022     02/21/2022       CMP:   Lab Results   Component Value Date     02/21/2022    K 3.8 02/21/2022     02/21/2022    CO2 25 02/21/2022    BUN 5 02/21/2022    CREATININE 0.6 02/21/2022    GFRAA >60 02/21/2022    LABGLOM >60 02/21/2022    GLUCOSE 85 02/21/2022    PROT 6.0 02/21/2022    CALCIUM 8.4 02/21/2022    BILITOT 0.8 02/21/2022    ALKPHOS 71 02/21/2022    AST 12 02/21/2022    ALT 14 02/21/2022       POC Tests: No results for input(s): POCGLU, POCNA, POCK, POCCL, POCBUN, POCHEMO, POCHCT in the last 72 hours. Coags: No results found for: PROTIME, INR, APTT    HCG (If Applicable):   Lab Results   Component Value Date    PREGTESTUR NEGATIVE 11/21/2015        ABGs: No results found for: PHART, PO2ART, UAX8ZZM, NCS0TUA, BEART, X7EYGISE     Type & Screen (If Applicable):  No results found for: LABABO, LABRH    Drug/Infectious Status (If Applicable):  No results found for: HIV, HEPCAB    COVID-19 Screening (If Applicable): No results found for: COVID19        Anesthesia Evaluation  Patient summary reviewed  Airway: Mallampati: III  TM distance: >3 FB   Neck ROM: full  Mouth opening: > = 3 FB Dental: normal exam         Pulmonary:Negative Pulmonary ROS breath sounds clear to auscultation                             Cardiovascular:Negative CV ROS             Beta Blocker:  Not on Beta Blocker         Neuro/Psych:   Negative Neuro/Psych ROS              GI/Hepatic/Renal: Neg GI/Hepatic/Renal ROS            Endo/Other: Negative Endo/Other ROS                    Abdominal:   (+) obese,           Vascular: negative vascular ROS. Other Findings:           Anesthesia Plan      general     ASA 1       Induction: intravenous. Anesthetic plan and risks discussed with patient. Use of blood products discussed with patient whom consented to blood products. Plan discussed with CRNA. Pre Anesthesia Assessment complete. Chart reviewed on 2/21/2022. This is a chart review only.       Marisol Dowling DO   2/21/2022

## 2022-02-22 ENCOUNTER — ANESTHESIA (OUTPATIENT)
Dept: OPERATING ROOM | Age: 32
End: 2022-02-22
Payer: COMMERCIAL

## 2022-02-22 VITALS
DIASTOLIC BLOOD PRESSURE: 111 MMHG | SYSTOLIC BLOOD PRESSURE: 125 MMHG | OXYGEN SATURATION: 100 % | RESPIRATION RATE: 10 BRPM | TEMPERATURE: 98.6 F

## 2022-02-22 LAB
CULTURE: ABNORMAL
CULTURE: ABNORMAL
Lab: ABNORMAL
SARS-COV-2, NAAT: NOT DETECTED
SOURCE: NORMAL
SPECIMEN: ABNORMAL

## 2022-02-22 PROCEDURE — 7100000001 HC PACU RECOVERY - ADDTL 15 MIN: Performed by: SURGERY

## 2022-02-22 PROCEDURE — 6360000002 HC RX W HCPCS: Performed by: SURGERY

## 2022-02-22 PROCEDURE — 88304 TISSUE EXAM BY PATHOLOGIST: CPT

## 2022-02-22 PROCEDURE — 2500000003 HC RX 250 WO HCPCS: Performed by: SURGERY

## 2022-02-22 PROCEDURE — 3700000000 HC ANESTHESIA ATTENDED CARE: Performed by: SURGERY

## 2022-02-22 PROCEDURE — 2709999900 HC NON-CHARGEABLE SUPPLY: Performed by: SURGERY

## 2022-02-22 PROCEDURE — 87635 SARS-COV-2 COVID-19 AMP PRB: CPT

## 2022-02-22 PROCEDURE — G0378 HOSPITAL OBSERVATION PER HR: HCPCS

## 2022-02-22 PROCEDURE — 96376 TX/PRO/DX INJ SAME DRUG ADON: CPT

## 2022-02-22 PROCEDURE — 3600000014 HC SURGERY LEVEL 4 ADDTL 15MIN: Performed by: SURGERY

## 2022-02-22 PROCEDURE — 2580000003 HC RX 258: Performed by: SURGERY

## 2022-02-22 PROCEDURE — 6360000002 HC RX W HCPCS

## 2022-02-22 PROCEDURE — 3600000004 HC SURGERY LEVEL 4 BASE: Performed by: SURGERY

## 2022-02-22 PROCEDURE — 7100000000 HC PACU RECOVERY - FIRST 15 MIN: Performed by: SURGERY

## 2022-02-22 PROCEDURE — 96366 THER/PROPH/DIAG IV INF ADDON: CPT

## 2022-02-22 PROCEDURE — 3700000001 HC ADD 15 MINUTES (ANESTHESIA): Performed by: SURGERY

## 2022-02-22 PROCEDURE — 2500000003 HC RX 250 WO HCPCS

## 2022-02-22 RX ORDER — ONDANSETRON 2 MG/ML
4 INJECTION INTRAMUSCULAR; INTRAVENOUS
Status: DISCONTINUED | OUTPATIENT
Start: 2022-02-22 | End: 2022-02-22 | Stop reason: HOSPADM

## 2022-02-22 RX ORDER — FENTANYL CITRATE 50 UG/ML
50 INJECTION, SOLUTION INTRAMUSCULAR; INTRAVENOUS EVERY 5 MIN PRN
Status: DISCONTINUED | OUTPATIENT
Start: 2022-02-22 | End: 2022-02-22 | Stop reason: HOSPADM

## 2022-02-22 RX ORDER — BUPIVACAINE HYDROCHLORIDE 5 MG/ML
INJECTION, SOLUTION EPIDURAL; INTRACAUDAL
Status: COMPLETED | OUTPATIENT
Start: 2022-02-22 | End: 2022-02-22

## 2022-02-22 RX ORDER — LIDOCAINE HYDROCHLORIDE 20 MG/ML
INJECTION, SOLUTION EPIDURAL; INFILTRATION; INTRACAUDAL; PERINEURAL PRN
Status: DISCONTINUED | OUTPATIENT
Start: 2022-02-22 | End: 2022-02-22 | Stop reason: SDUPTHER

## 2022-02-22 RX ORDER — SODIUM CHLORIDE 0.9 % (FLUSH) 0.9 %
5-40 SYRINGE (ML) INJECTION EVERY 12 HOURS SCHEDULED
Status: DISCONTINUED | OUTPATIENT
Start: 2022-02-22 | End: 2022-02-22 | Stop reason: HOSPADM

## 2022-02-22 RX ORDER — KETOROLAC TROMETHAMINE 30 MG/ML
30 INJECTION, SOLUTION INTRAMUSCULAR; INTRAVENOUS EVERY 6 HOURS PRN
Status: DISCONTINUED | OUTPATIENT
Start: 2022-02-22 | End: 2022-02-23 | Stop reason: HOSPADM

## 2022-02-22 RX ORDER — SODIUM CHLORIDE 9 MG/ML
25 INJECTION, SOLUTION INTRAVENOUS PRN
Status: DISCONTINUED | OUTPATIENT
Start: 2022-02-22 | End: 2022-02-22 | Stop reason: HOSPADM

## 2022-02-22 RX ORDER — SODIUM CHLORIDE 0.9 % (FLUSH) 0.9 %
5-40 SYRINGE (ML) INJECTION PRN
Status: DISCONTINUED | OUTPATIENT
Start: 2022-02-22 | End: 2022-02-22 | Stop reason: HOSPADM

## 2022-02-22 RX ORDER — PROPOFOL 10 MG/ML
INJECTION, EMULSION INTRAVENOUS PRN
Status: DISCONTINUED | OUTPATIENT
Start: 2022-02-22 | End: 2022-02-22 | Stop reason: SDUPTHER

## 2022-02-22 RX ORDER — ROCURONIUM BROMIDE 10 MG/ML
INJECTION, SOLUTION INTRAVENOUS PRN
Status: DISCONTINUED | OUTPATIENT
Start: 2022-02-22 | End: 2022-02-22 | Stop reason: SDUPTHER

## 2022-02-22 RX ORDER — MIDAZOLAM HYDROCHLORIDE 1 MG/ML
INJECTION INTRAMUSCULAR; INTRAVENOUS PRN
Status: DISCONTINUED | OUTPATIENT
Start: 2022-02-22 | End: 2022-02-22 | Stop reason: SDUPTHER

## 2022-02-22 RX ORDER — SODIUM CHLORIDE, SODIUM LACTATE, POTASSIUM CHLORIDE, CALCIUM CHLORIDE 600; 310; 30; 20 MG/100ML; MG/100ML; MG/100ML; MG/100ML
INJECTION, SOLUTION INTRAVENOUS CONTINUOUS
Status: DISCONTINUED | OUTPATIENT
Start: 2022-02-22 | End: 2022-02-23 | Stop reason: HOSPADM

## 2022-02-22 RX ORDER — LABETALOL HYDROCHLORIDE 5 MG/ML
10 INJECTION, SOLUTION INTRAVENOUS
Status: DISCONTINUED | OUTPATIENT
Start: 2022-02-22 | End: 2022-02-22 | Stop reason: HOSPADM

## 2022-02-22 RX ORDER — FENTANYL CITRATE 50 UG/ML
25 INJECTION, SOLUTION INTRAMUSCULAR; INTRAVENOUS EVERY 5 MIN PRN
Status: DISCONTINUED | OUTPATIENT
Start: 2022-02-22 | End: 2022-02-22 | Stop reason: HOSPADM

## 2022-02-22 RX ORDER — ONDANSETRON 2 MG/ML
INJECTION INTRAMUSCULAR; INTRAVENOUS PRN
Status: DISCONTINUED | OUTPATIENT
Start: 2022-02-22 | End: 2022-02-22 | Stop reason: SDUPTHER

## 2022-02-22 RX ORDER — HYDROMORPHONE HCL 110MG/55ML
PATIENT CONTROLLED ANALGESIA SYRINGE INTRAVENOUS PRN
Status: DISCONTINUED | OUTPATIENT
Start: 2022-02-22 | End: 2022-02-22 | Stop reason: SDUPTHER

## 2022-02-22 RX ORDER — FENTANYL CITRATE 50 UG/ML
INJECTION, SOLUTION INTRAMUSCULAR; INTRAVENOUS PRN
Status: DISCONTINUED | OUTPATIENT
Start: 2022-02-22 | End: 2022-02-22 | Stop reason: SDUPTHER

## 2022-02-22 RX ADMIN — HYDROMORPHONE HYDROCHLORIDE 0.5 MG: 2 INJECTION, SOLUTION INTRAMUSCULAR; INTRAVENOUS; SUBCUTANEOUS at 09:11

## 2022-02-22 RX ADMIN — FENTANYL CITRATE 50 MCG: 50 INJECTION, SOLUTION INTRAMUSCULAR; INTRAVENOUS at 08:32

## 2022-02-22 RX ADMIN — PIPERACILLIN AND TAZOBACTAM 3375 MG: 3; .375 INJECTION, POWDER, LYOPHILIZED, FOR SOLUTION INTRAVENOUS at 04:23

## 2022-02-22 RX ADMIN — MIDAZOLAM 2 MG: 1 INJECTION INTRAMUSCULAR; INTRAVENOUS at 08:25

## 2022-02-22 RX ADMIN — FENTANYL CITRATE 50 MCG: 50 INJECTION, SOLUTION INTRAMUSCULAR; INTRAVENOUS at 08:56

## 2022-02-22 RX ADMIN — MORPHINE SULFATE 4 MG: 4 INJECTION, SOLUTION INTRAMUSCULAR; INTRAVENOUS at 04:15

## 2022-02-22 RX ADMIN — SODIUM CHLORIDE, POTASSIUM CHLORIDE, SODIUM LACTATE AND CALCIUM CHLORIDE: 600; 310; 30; 20 INJECTION, SOLUTION INTRAVENOUS at 01:27

## 2022-02-22 RX ADMIN — ONDANSETRON 4 MG: 2 INJECTION INTRAMUSCULAR; INTRAVENOUS at 09:34

## 2022-02-22 RX ADMIN — MORPHINE SULFATE 4 MG: 4 INJECTION, SOLUTION INTRAMUSCULAR; INTRAVENOUS at 20:09

## 2022-02-22 RX ADMIN — SODIUM CHLORIDE, POTASSIUM CHLORIDE, SODIUM LACTATE AND CALCIUM CHLORIDE: 600; 310; 30; 20 INJECTION, SOLUTION INTRAVENOUS at 03:29

## 2022-02-22 RX ADMIN — MORPHINE SULFATE 4 MG: 4 INJECTION, SOLUTION INTRAMUSCULAR; INTRAVENOUS at 23:58

## 2022-02-22 RX ADMIN — PIPERACILLIN AND TAZOBACTAM 3375 MG: 3; .375 INJECTION, POWDER, LYOPHILIZED, FOR SOLUTION INTRAVENOUS at 20:11

## 2022-02-22 RX ADMIN — SODIUM CHLORIDE, POTASSIUM CHLORIDE, SODIUM LACTATE AND CALCIUM CHLORIDE: 600; 310; 30; 20 INJECTION, SOLUTION INTRAVENOUS at 20:10

## 2022-02-22 RX ADMIN — MORPHINE SULFATE 4 MG: 4 INJECTION, SOLUTION INTRAMUSCULAR; INTRAVENOUS at 15:00

## 2022-02-22 RX ADMIN — PIPERACILLIN AND TAZOBACTAM 3375 MG: 3; .375 INJECTION, POWDER, LYOPHILIZED, FOR SOLUTION INTRAVENOUS at 11:33

## 2022-02-22 RX ADMIN — LIDOCAINE HYDROCHLORIDE 100 MG: 20 INJECTION, SOLUTION EPIDURAL; INFILTRATION; INTRACAUDAL; PERINEURAL at 08:34

## 2022-02-22 RX ADMIN — SODIUM CHLORIDE, POTASSIUM CHLORIDE, SODIUM LACTATE AND CALCIUM CHLORIDE: 600; 310; 30; 20 INJECTION, SOLUTION INTRAVENOUS at 10:41

## 2022-02-22 RX ADMIN — ROCURONIUM BROMIDE 50 MG: 10 INJECTION INTRAVENOUS at 08:34

## 2022-02-22 RX ADMIN — PROPOFOL 180 MG: 10 INJECTION, EMULSION INTRAVENOUS at 08:34

## 2022-02-22 RX ADMIN — HYDROMORPHONE HYDROCHLORIDE 0.5 MG: 2 INJECTION, SOLUTION INTRAMUSCULAR; INTRAVENOUS; SUBCUTANEOUS at 09:19

## 2022-02-22 RX ADMIN — SUGAMMADEX 200 MG: 100 INJECTION, SOLUTION INTRAVENOUS at 09:40

## 2022-02-22 RX ADMIN — MORPHINE SULFATE 4 MG: 4 INJECTION, SOLUTION INTRAMUSCULAR; INTRAVENOUS at 11:20

## 2022-02-22 ASSESSMENT — PULMONARY FUNCTION TESTS
PIF_VALUE: 26
PIF_VALUE: 3
PIF_VALUE: 0
PIF_VALUE: 20
PIF_VALUE: 28
PIF_VALUE: 24
PIF_VALUE: 23
PIF_VALUE: 20
PIF_VALUE: 26
PIF_VALUE: 28
PIF_VALUE: 20
PIF_VALUE: 22
PIF_VALUE: 23
PIF_VALUE: 24
PIF_VALUE: 28
PIF_VALUE: 23
PIF_VALUE: 22
PIF_VALUE: 23
PIF_VALUE: 28
PIF_VALUE: 27
PIF_VALUE: 26
PIF_VALUE: 20
PIF_VALUE: 25
PIF_VALUE: 27
PIF_VALUE: 21
PIF_VALUE: 25
PIF_VALUE: 0
PIF_VALUE: 23
PIF_VALUE: 5
PIF_VALUE: 17
PIF_VALUE: 20
PIF_VALUE: 17
PIF_VALUE: 24
PIF_VALUE: 29
PIF_VALUE: 23
PIF_VALUE: 20
PIF_VALUE: 25
PIF_VALUE: 23
PIF_VALUE: 28
PIF_VALUE: 27
PIF_VALUE: 28
PIF_VALUE: 23
PIF_VALUE: 28
PIF_VALUE: 26
PIF_VALUE: 28
PIF_VALUE: 27
PIF_VALUE: 23
PIF_VALUE: 3
PIF_VALUE: 1
PIF_VALUE: 24
PIF_VALUE: 23
PIF_VALUE: 19
PIF_VALUE: 23
PIF_VALUE: 28
PIF_VALUE: 27
PIF_VALUE: 28
PIF_VALUE: 28
PIF_VALUE: 20
PIF_VALUE: 28
PIF_VALUE: 29
PIF_VALUE: 22
PIF_VALUE: 23
PIF_VALUE: 26
PIF_VALUE: 20
PIF_VALUE: 28
PIF_VALUE: 27
PIF_VALUE: 28
PIF_VALUE: 23
PIF_VALUE: 1
PIF_VALUE: 23
PIF_VALUE: 27
PIF_VALUE: 27
PIF_VALUE: 1
PIF_VALUE: 0
PIF_VALUE: 20
PIF_VALUE: 29
PIF_VALUE: 28
PIF_VALUE: 23
PIF_VALUE: 23
PIF_VALUE: 29
PIF_VALUE: 23
PIF_VALUE: 0
PIF_VALUE: 2
PIF_VALUE: 20
PIF_VALUE: 29
PIF_VALUE: 28
PIF_VALUE: 20
PIF_VALUE: 20

## 2022-02-22 ASSESSMENT — PAIN DESCRIPTION - PAIN TYPE
TYPE: SURGICAL PAIN

## 2022-02-22 ASSESSMENT — PAIN DESCRIPTION - LOCATION
LOCATION: ABDOMEN

## 2022-02-22 ASSESSMENT — PAIN DESCRIPTION - DESCRIPTORS
DESCRIPTORS: SHARP
DESCRIPTORS: SHARP
DESCRIPTORS: ACHING
DESCRIPTORS: ACHING

## 2022-02-22 ASSESSMENT — PAIN DESCRIPTION - PROGRESSION
CLINICAL_PROGRESSION: GRADUALLY WORSENING
CLINICAL_PROGRESSION: GRADUALLY WORSENING

## 2022-02-22 ASSESSMENT — PAIN SCALES - GENERAL
PAINLEVEL_OUTOF10: 9
PAINLEVEL_OUTOF10: 9
PAINLEVEL_OUTOF10: 2
PAINLEVEL_OUTOF10: 9
PAINLEVEL_OUTOF10: 4
PAINLEVEL_OUTOF10: 7
PAINLEVEL_OUTOF10: 3
PAINLEVEL_OUTOF10: 4
PAINLEVEL_OUTOF10: 8
PAINLEVEL_OUTOF10: 9
PAINLEVEL_OUTOF10: 0

## 2022-02-22 ASSESSMENT — PAIN - FUNCTIONAL ASSESSMENT: PAIN_FUNCTIONAL_ASSESSMENT: 0-10

## 2022-02-22 ASSESSMENT — PAIN DESCRIPTION - DIRECTION
RADIATING_TOWARDS: ACROSS

## 2022-02-22 ASSESSMENT — PAIN DESCRIPTION - ORIENTATION
ORIENTATION: MID
ORIENTATION: MID
ORIENTATION: LOWER

## 2022-02-22 ASSESSMENT — PAIN DESCRIPTION - FREQUENCY
FREQUENCY: CONTINUOUS

## 2022-02-22 ASSESSMENT — PAIN DESCRIPTION - ONSET
ONSET: ON-GOING
ONSET: ON-GOING

## 2022-02-22 NOTE — PROGRESS NOTES
0957: Arrived to PACU from OR. Monitors applied, alarms on. Report obtained from [facility] and [employer]. 1035: Turned and repositioned in bed, had voided large amount urine, beltran care given, linens changed, denies need for pain med at present, ice chips given. 1056:Transported to room 1109.

## 2022-02-22 NOTE — OP NOTE
[address] [place] [address], [address] [place] [address]                                OPERATIVE REPORT    PATIENT NAME: Radha Matson                    :        1990  MED REC NO:   [id number]                          ROOM:  ACCOUNT NO:   [de-identified]                           ADMIT DATE: 2022  PROVIDER:     Faby Casiano MD    DATE OF PROCEDURE:  2022    PREOPERATIVE DIAGNOSIS:  Acute calculous cholecystitis. POSTOPERATIVE DIAGNOSIS:  Acute and chronic cholecystitis with hydrops  of the gallbladder. OPERATION PERFORMED:  Laparoscopic cholecystectomy. OPERATING SURGEON:  Faby Casiano MD    ANESTHESIA:  General anesthesia    BLOOD LOSS:  10 mL. DRAINS:  None. SPECIMENS:  Gallbladder. COMPLICATIONS:  None. PROCEDURE IN DETAIL:  The patient was brought to the operating room  where preoperative antibiotics were administered and general anesthetic  was administered. The abdomen was prepped and draped in normal sterile  fashion. The small infraumbilical incision was made. The Veress needle  was inserted, pneumoperitoneum achieved, and 5 mm port was placed. Laparoscopic was inserted in the peritoneal cavity. The patient's  gallbladder was distended and acute and chronically inflamed with  omentum stuck to the gallbladder. An 11-port was placed in the upper  midline and a 5-port in the right lateral abdomen. The patient was  positioned. The omentum was peeled away from the gallbladder. I had  purposely decompressed the gallbladder with cautery aspiration device. Hydrops of the gallbladder was encountered, but the gallbladder was  completely decompressed. I then dissected down to the neck of the  gallbladder, which was inflamed, but I clearly identified the anatomy  identifying two branches of the cystic artery and an enlarged cystic  duct.   The artery branches were clipped twice proximally, once

## 2022-02-22 NOTE — BRIEF OP NOTE
Brief Postoperative Note      Patient: Farideh Avalos  YOB: 1990  MRN: [id number]    Date of Procedure: 2/22/2022    Pre-Op Diagnosis: CCC and acute with hydrops    Post-Op Diagnosis: Same       Procedure(s):  CHOLECYSTECTOMY LAPAROSCOPIC    Surgeon(s):  Arthur Gamboa MD    Assistant:  * No surgical staff found *    Anesthesia: General    Estimated Blood Loss (mL): Minimal    Complications: None    Specimens:   ID Type Source Tests Collected by Time Destination   A : Gallbladder and contents Tissue Tissue SURGICAL PATHOLOGY Arthur Gamboa MD 2/22/2022 7688        Implants:  * No implants in log *      Drains: * No LDAs found *    Findings: see dictation    Electronically signed by Arthur Gamboa MD on 2/22/2022 at 9:47 AM

## 2022-02-23 VITALS
HEIGHT: 69 IN | HEART RATE: 70 BPM | TEMPERATURE: 98.4 F | DIASTOLIC BLOOD PRESSURE: 64 MMHG | OXYGEN SATURATION: 94 % | SYSTOLIC BLOOD PRESSURE: 107 MMHG | BODY MASS INDEX: 34.07 KG/M2 | RESPIRATION RATE: 22 BRPM | WEIGHT: 230 LBS

## 2022-02-23 PROCEDURE — 94761 N-INVAS EAR/PLS OXIMETRY MLT: CPT

## 2022-02-23 PROCEDURE — 2580000003 HC RX 258: Performed by: SURGERY

## 2022-02-23 PROCEDURE — 2700000000 HC OXYGEN THERAPY PER DAY

## 2022-02-23 PROCEDURE — 96375 TX/PRO/DX INJ NEW DRUG ADDON: CPT

## 2022-02-23 PROCEDURE — 96366 THER/PROPH/DIAG IV INF ADDON: CPT

## 2022-02-23 PROCEDURE — 6360000002 HC RX W HCPCS: Performed by: SURGERY

## 2022-02-23 PROCEDURE — 96376 TX/PRO/DX INJ SAME DRUG ADON: CPT

## 2022-02-23 PROCEDURE — G0378 HOSPITAL OBSERVATION PER HR: HCPCS

## 2022-02-23 RX ORDER — HYDROCODONE BITARTRATE AND ACETAMINOPHEN 5; 325 MG/1; MG/1
1 TABLET ORAL EVERY 4 HOURS PRN
Qty: 18 TABLET | Refills: 0 | Status: SHIPPED | OUTPATIENT
Start: 2022-02-23 | End: 2022-02-26

## 2022-02-23 RX ORDER — HYDROCODONE BITARTRATE AND ACETAMINOPHEN 5; 325 MG/1; MG/1
1 TABLET ORAL EVERY 6 HOURS PRN
Status: DISCONTINUED | OUTPATIENT
Start: 2022-02-23 | End: 2022-02-23 | Stop reason: HOSPADM

## 2022-02-23 RX ADMIN — KETOROLAC TROMETHAMINE 30 MG: 30 INJECTION, SOLUTION INTRAMUSCULAR at 13:20

## 2022-02-23 RX ADMIN — PIPERACILLIN AND TAZOBACTAM 3375 MG: 3; .375 INJECTION, POWDER, LYOPHILIZED, FOR SOLUTION INTRAVENOUS at 03:09

## 2022-02-23 RX ADMIN — SODIUM CHLORIDE, POTASSIUM CHLORIDE, SODIUM LACTATE AND CALCIUM CHLORIDE: 600; 310; 30; 20 INJECTION, SOLUTION INTRAVENOUS at 06:31

## 2022-02-23 RX ADMIN — MORPHINE SULFATE 4 MG: 4 INJECTION, SOLUTION INTRAMUSCULAR; INTRAVENOUS at 07:17

## 2022-02-23 RX ADMIN — MORPHINE SULFATE 4 MG: 4 INJECTION, SOLUTION INTRAMUSCULAR; INTRAVENOUS at 03:15

## 2022-02-23 RX ADMIN — PIPERACILLIN AND TAZOBACTAM 3375 MG: 3; .375 INJECTION, POWDER, LYOPHILIZED, FOR SOLUTION INTRAVENOUS at 12:15

## 2022-02-23 ASSESSMENT — PAIN SCALES - GENERAL
PAINLEVEL_OUTOF10: 6
PAINLEVEL_OUTOF10: 7
PAINLEVEL_OUTOF10: 7
PAINLEVEL_OUTOF10: 8

## 2022-02-23 NOTE — ANESTHESIA POSTPROCEDURE EVALUATION
Department of Anesthesiology  Postprocedure Note    Patient: Pratik Olivier  MRN: [id number]  YOB: 1990  Date of evaluation: 2/22/2022  Time:  9:23 PM     Procedure Summary     Date: 02/22/22 Room / Location: [place] [address] / [facility]    Anesthesia Start: [facility] Anesthesia Stop: 1001    Procedure: CHOLECYSTECTOMY LAPAROSCOPIC (N/A Abdomen) Diagnosis: ([address])    Surgeons: Mihaela Michael MD Responsible Provider: No Penaloza MD    Anesthesia Type: general ASA Status: 1          Anesthesia Type: general    Kirstie Phase I: Kirstie Score: 3    Kirstie Phase II:      Last vitals: Reviewed and per EMR flowsheets.        Anesthesia Post Evaluation    Patient location during evaluation: PACU  Patient participation: complete - patient participated  Pain score: 2  Airway patency: patent  Nausea & Vomiting: no nausea and no vomiting  Complications: no  Cardiovascular status: hemodynamically stable  Respiratory status: acceptable  Hydration status: euvolemic

## 2022-02-23 NOTE — DISCHARGE SUMMARY
[address] [address] [facility], [address] [facility]                               DISCHARGE SUMMARY    PATIENT NAME: Donald Domingo                    :        1990  MED REC NO:   [id number]                          ROOM:       1109  ACCOUNT NO:   [de-identified]                           ADMIT DATE: 2022  PROVIDER:     John Sanchez MD                  DISCHARGE DATE:    DISCHARGE DIAGNOSIS:  Acute hemorrhagic cholecystitis with gallstones. PROCEDURES PERFORMED:  Laparoscopic cholecystectomy. DISPOSITION AND FOLLOWUP:  The patient was discharged home in a good  condition. MEDICATIONS:  Include Norco as needed for pain. DIET:  Low-fat diet. ACTIVITY LIMITATIONS:  No strenuous activity for one week. FOLLOWUP:  The patient will see Dr. Lilly Wilburn in one week. HISTORY OF PRESENT ILLNESS AND HOSPITAL COURSE:  A 44-year-old female  who approximately five years ago presented with choledocholithiasis and  gallstones, and had ERCP. She was instructed to followup for elective  laparoscopic cholecystectomy. The patient did not follow up. She came  to the emergency room with signs and symptoms of cholecystitis. She was  admitted to the hospital, received IV fluids, antibiotics, and pain  medication. I saw the patient, took her to surgery on , and she  underwent for laparoscopic cholecystectomy for acute cholecystitis. The  following day, she was doing well. She was tolerating a liquid diet. Her pain was controlled. Her abdomen was soft. She was given discharge  instructions and she will be discharged home today, to be seen back in  the office in one week.         Anna Lara MD    D: 2022 13:15:33       T: 2022 14:02:15     [id number]  Job#: [id number]     Doc#: [id number]    CC:

## 2022-02-24 ENCOUNTER — CARE COORDINATION (OUTPATIENT)
Dept: CASE MANAGEMENT | Age: 32
End: 2022-02-24

## 2022-02-24 NOTE — CARE COORDINATION
Care Transitions Outreach Attempt    Call within 2 business days of discharge: Yes   Attempted to reach patient for transitions of care follow up. Unable to reach patient. Patient: Brittney Dial Patient : 1990 MRN: <[id number]>    Last Discharge [facility]       Complaint Diagnosis Description Type Department Provider    22 Abdominal Pain Acute cholecystitis . .. ED to Hosp-Admission (Discharged) (ADMITTED) [address], MD        24 Hour Transition of Care Call:    1st Attempt to contact patient for Initial 24 Hour Transition from hospital to home Call:   Patient not reached. Voice Mail Not set up at this time - Unable to leave message. Will continue to follow. Eva Verduzco, CHELY    [phone]  Cleopatra Nicholson / Benny Otto Coordinator          Was this an external facility discharge? No Discharge Facility: [facility]    Noted following upcoming appointments from discharge chart review:   [facility] follow up appointment(s): No future appointments.   Non-[facility] follow up appointment(s):

## 2022-02-25 ENCOUNTER — CARE COORDINATION (OUTPATIENT)
Dept: CASE MANAGEMENT | Age: 32
End: 2022-02-25

## 2022-02-25 NOTE — CARE COORDINATION
Care Transitions Outreach Attempt    Call within 2 business days of discharge: Yes   Patient: Elie Ojeda Patient : 1990 MRN: [id number]  Facility: [facility]    Last Discharge [facility]       Complaint Diagnosis Description Type Department Provider    22 Abdominal Pain Acute cholecystitis . .. ED to Hosp-Admission (Discharged) (Paxton Hammonds) Deborah Cox MD      Noted following upcoming appointments from discharge chart review:   [facility] follow up appointment(s): No future appointments. 2nd unsuccessful attempt to reach for Care Transition discharge call; no answer, vm not set up. No HIPAA form on file for approved EC. Episode closed per protocol, no further outreach scheduled.  [address] [place] [address] [phone]

## 2022-06-28 ENCOUNTER — HOSPITAL ENCOUNTER (EMERGENCY)
Age: 32
Discharge: HOME | End: 2022-06-29
Payer: COMMERCIAL

## 2022-06-28 ENCOUNTER — APPOINTMENT (OUTPATIENT)
Dept: GENERAL RADIOLOGY | Age: 32
End: 2022-06-28
Payer: COMMERCIAL

## 2022-06-28 VITALS
OXYGEN SATURATION: 99 % | RESPIRATION RATE: 16 BRPM | BODY MASS INDEX: 33.33 KG/M2 | DIASTOLIC BLOOD PRESSURE: 71 MMHG | HEART RATE: 66 BPM | SYSTOLIC BLOOD PRESSURE: 129 MMHG | TEMPERATURE: 98.1 F | HEIGHT: 69 IN | WEIGHT: 225 LBS

## 2022-06-28 DIAGNOSIS — S82.891A CLOSED FRACTURE OF RIGHT ANKLE, INITIAL ENCOUNTER: Primary | ICD-10-CM

## 2022-06-28 PROCEDURE — 73610 X-RAY EXAM OF ANKLE: CPT

## 2022-06-28 PROCEDURE — 99283 EMERGENCY DEPT VISIT LOW MDM: CPT

## 2022-06-28 ASSESSMENT — PAIN DESCRIPTION - LOCATION: LOCATION: ANKLE

## 2022-06-28 ASSESSMENT — PAIN SCALES - GENERAL: PAINLEVEL_OUTOF10: 7

## 2022-06-28 ASSESSMENT — PAIN - FUNCTIONAL ASSESSMENT: PAIN_FUNCTIONAL_ASSESSMENT: 0-10

## 2022-06-28 ASSESSMENT — PAIN DESCRIPTION - ORIENTATION: ORIENTATION: RIGHT

## 2022-06-29 NOTE — ED PROVIDER NOTES
Triage Chief Complaint:   Ankle Pain (R sided after twisting at work. Maverick Valencia works at [employer] )    [place]:  Aristides Winchester is a 33 y.o. female that presents today complaining of  R sided ankle pain. Context is, pt slipped while at work and twisted her ankle. No paresthesias. Pain is ranked 06/10. Patient admits  To no radiation. Pain is made worse with movement and palpation. Pain relieved some with rest.     ROS:  At least 06 systems reviewed and otherwise negative except as in the [address] Past Medical History:   Diagnosis Date    Gallbladder disease      Past Surgical History:   Procedure Laterality Date    CHOLECYSTECTOMY, LAPAROSCOPIC N/A 2/22/2022    CHOLECYSTECTOMY LAPAROSCOPIC performed by Luke Gambino MD at [place] [address] reviewed. No pertinent family history. Social History     Socioeconomic History    Marital status:      Spouse name: Not on file    Number of children: Not on file    Years of education: Not on file    Highest education level: Not on file   Occupational History    Not on file   Tobacco Use    Smoking status: Never Smoker    Smokeless tobacco: Never Used   Vaping Use    Vaping Use: Never used   Substance and Sexual Activity    Alcohol use: Yes     Comment: occasionally     Drug use: No    Sexual activity: Not on file   Other Topics Concern    Not on file   Social History Narrative    Not on file     Social Determinants of Health     Financial Resource Strain:     Difficulty of Paying Living Expenses: Not on file   Food Insecurity:     Worried About Running Out of Food in the Last Year: Not on file    Kelly of Food in the Last Year: Not on file   Transportation Needs:     Lack of Transportation (Medical): Not on file    Lack of Transportation (Non-Medical):  Not on file   Physical Activity:     Days of Exercise per Week: Not on file    Minutes of Exercise per Session: Not on file   Stress:     Feeling of Stress : Not on file   Social Connections:     Frequency of Communication with Friends and Family: Not on file    Frequency of Social Gatherings with Friends and Family: Not on file    Attends [employer] Services: Not on file    Active Member of Clubs or Organizations: Not on file    Attends Club or Organization Meetings: Not on file    Marital Status: Not on file   Intimate Partner Violence:     Fear of Current or Ex-Partner: Not on file    Emotionally Abused: Not on file    Physically Abused: Not on file    Sexually Abused: Not on file   Housing Stability:     Unable to Pay for Housing in the Last Year: Not on file    Number of [place] in the Last Year: Not on file    Unstable Housing in the Last Year: Not on file     No current facility-administered medications for this encounter. Current Outpatient Medications   Medication Sig Dispense Refill    ondansetron (ZOFRAN ODT) 4 MG disintegrating tablet Take 1 tablet by mouth every 8 hours as needed for Nausea or Vomiting (Patient not taking: Reported on 3/7/2022) 10 tablet 0    ibuprofen (ADVIL;MOTRIN) 600 MG tablet Take 1 tablet by mouth every 6 hours as needed for Pain 30 tablet 0     Allergies   Allergen Reactions    Prednisone Rash       Nursing Notes Reviewed    Physical Exam:  ED Triage Vitals   Enc Vitals Group      BP 06/28/22 2008 129/71      Heart Rate 06/28/22 2008 66      Resp 06/28/22 2008 16      Temp 06/28/22 2008 98.1 °F (36.7 °C)      Temp Source 06/28/22 2008 Oral      SpO2 06/28/22 2008 99 %      Weight 06/28/22 2005 225 lb (102.1 kg)      Height 06/28/22 2005 5' 9\" (1.753 m)      Head Circumference --       Peak Flow --       Pain Score --       Pain Loc --       Pain Edu? --       Excl. in [address]? --      GENERAL APPEARANCE: Awake and alert. Cooperative. No acute distress. HEAD: Normocephalic. Atraumatic. NECK: Full ROM  LUNGS: Respirations unlabored. ABDOMEN: Soft. Non-tender. No guarding or rebound. No organomegaly.  No palpable masses  MUSCULOSKELETAL: RLE. Compartments soft. Dp/pt pulse 2+. No ligamenous laxity. AROM limited due to pain. No medial malleolar ttp or edema. significalnt lateral malleolar ttp and edema. No breaks in skin. SKIN: Warm and dry. No rash, No erythema, No edema. No ecchymoses. NEUROLOGICAL: No gross facial drooping. Moves all 4 extremities spontaneously. PSYCHIATRIC: Normal mood. I have reviewed and interpreted all of the currently available lab results from this visit (if applicable):  No results found for this visit on 06/28/22. Radiographs (if obtained):  [] The following radiograph was interpreted by myself in the absence of a radiologist:   [] Radiologist's Report Reviewed:  XR ANKLE RIGHT (MIN 3 VIEWS)   Final Result   Small avulsion fragments can be seen under the right fibula in the region of   the lateral malleolus. Extensive soft tissue swelling over the lateral   malleolus. Ankle mortise appears normal and symmetrical.             EKG (if obtained):   Please See Note of attending physician for EKG interpretation. Chart review shows recent radiograph(s):  XR ANKLE RIGHT (MIN 3 VIEWS)    Result Date: 6/28/2022  EXAMINATION: THREE XRAY VIEWS OF THE RIGHT ANKLE 6/28/2022 9:23 pm COMPARISON: None. HISTORY: ORDERING SYSTEM PROVIDED HISTORY: right ankle pain TECHNOLOGIST PROVIDED HISTORY: Reason for exam:->right ankle pain Reason for Exam: right ankle pain FINDINGS: Extensive soft tissue swelling over the lateral malleolus and to a lesser degree over the medial malleolus. Tiny bony fragments can be seen below the right fibula indicating a likely avulsion injury. Ankle mortise is normal and symmetrical.  Calcaneus and talus appear intact. Small avulsion fragments can be seen under the right fibula in the region of the lateral malleolus. Extensive soft tissue swelling over the lateral malleolus. Ankle mortise appears normal and symmetrical.       MDM:   Neurovascularly intact.   Patient presents today with signs and symptoms that are congruent with musculoskeletal fx of the ankle. Orthopedic walking boot placed    I have very low clinical suspicion of any fracture. However patient is educated that should symptoms persist and did not improve over the next 4-5 days patient should have orthopedic follow up as referred for x-rays to rule out fracture. I estimate there is LOW risk for Fracture, COMPARTMENT SYNDROME, DEEP VENOUS THROMBOSIS, COMPLETE TENDON RUPTURE, OR NEUROVASCULAR INJURY, thus I consider the discharge disposition reasonable. Pt is to be discharged home. Pt is  to return immediately to the emergency department if he has any new, worrisome or worsening symptoms. Pt is to follow up with PCP within 2 days. Patient/Surrogate vocalizes agreement and understanding with this plan and he has no questions upon disposition. Pt is comfortable upon disposition home. Patient is stable, Patients vital signs are stable. Vital signs and nursing notes reviewed during ED course. I independently managed patient today in the ED. All pertinent Lab data and radiographic results reviewed with patient at bedside. The patient and/or the family were informed of the results of any tests/labs/imaging, the treatment plan, and time was allotted to answer questions. See chart for details of medications given during the ED stay. /71   Pulse 66   Temp 98.1 °F (36.7 °C) (Oral)   Resp 16   Ht 5' 9\" (1.753 m)   Wt 225 lb (102.1 kg)   LMP 06/14/2022   SpO2 99%   BMI 33.23 kg/m²       Clinical Impression:  1. Closed fracture of right ankle, initial encounter        Disposition referral (if applicable):  Rory Mejía DO  [address]  [address] [facility] [address]  [address]  [phone]    In 2 days      Disposition medications (if applicable):  New Prescriptions    No medications on file       Comment: Please note this report has been produced using speech recognition software and may contain errors related to that system including errors in grammar, punctuation, and spelling, as well as words and phrases that may be inappropriate. If there are any questions or concerns please feel free to contact the dictating provider for clarification.     Balbir Grande, [place], [place]  06/28/22 5101

## 2022-07-15 ENCOUNTER — OFFICE VISIT (OUTPATIENT)
Dept: ORTHOPEDIC SURGERY | Age: 32
End: 2022-07-15
Payer: COMMERCIAL

## 2022-07-15 ENCOUNTER — TELEPHONE (OUTPATIENT)
Dept: ORTHOPEDIC SURGERY | Age: 32
End: 2022-07-15

## 2022-07-15 VITALS
WEIGHT: 228 LBS | DIASTOLIC BLOOD PRESSURE: 81 MMHG | OXYGEN SATURATION: 98 % | BODY MASS INDEX: 33.77 KG/M2 | HEIGHT: 69 IN | SYSTOLIC BLOOD PRESSURE: 118 MMHG | HEART RATE: 65 BPM | RESPIRATION RATE: 18 BRPM

## 2022-07-15 DIAGNOSIS — S82.831A CLOSED AVULSION FRACTURE OF DISTAL FIBULA, RIGHT, INITIAL ENCOUNTER: Primary | ICD-10-CM

## 2022-07-15 PROCEDURE — 27786 TREATMENT OF ANKLE FRACTURE: CPT | Performed by: ORTHOPAEDIC SURGERY

## 2022-07-15 PROCEDURE — 99203 OFFICE O/P NEW LOW 30 MIN: CPT | Performed by: ORTHOPAEDIC SURGERY

## 2022-07-15 NOTE — PROGRESS NOTES
7/15/2022   Chief Complaint   Patient presents with    Ankle Pain     Right, [facility]        History of Present Illness:                             Madeline Mejia is a 33 y.o. female who presents today for evaluation of her right ankle pain and swelling. She had an injury while at work when she fell off a curb with an inversion injury to the ankle on 6/29/2022. She was placed to a walking boot and has been performing mild weightbearing since the injury. She initially had significant swelling and bruising primarily on the lateral aspect of her ankle which has improved some with rest and elevation. She has been able to wean off of her pain medications. She has been working on gentle range of motion activities which is coming along well. Patient presents to the office with a right ankle injury [facility]. Pt stated that she fell off a curb on 06/29/22 and twisted her ankle. Pt has been wearing a walking boot since with no issues. Pt stated she had swelling and bruising initially but has subsided. Pt stated she has discontinued pain medication and has no pain today. Pt reports only having pain with rotation in the ankle. Medical History  Patient's medications, allergies, past medical, surgical, social and family histories were reviewed and updated as appropriate. Review of Systems   Constitutional:  Negative for chills and fever. HENT:  Negative for congestion and sneezing. Eyes:  Negative for pain and redness. Respiratory:  Negative for chest tightness, shortness of breath and wheezing. Cardiovascular:  Negative for chest pain and palpitations. Gastrointestinal:  Negative for vomiting. Musculoskeletal:  Positive for arthralgias. Skin:  Negative for color change and rash. Neurological:  Negative for weakness and numbness. Psychiatric/Behavioral:  Negative for agitation. The patient is not nervous/anxious.                                               Examination:  General Exam:  Vitals: /81   Pulse 65   Resp 18   Ht 5' 9\" (1.753 m)   Wt 228 lb (103.4 kg)   SpO2 98%   BMI 33.67 kg/m²    Physical Exam  Vitals and nursing note reviewed. Constitutional:       Appearance: Normal appearance. HENT:      Head: Normocephalic and atraumatic. Eyes:      Conjunctiva/sclera: Conjunctivae normal.      Pupils: Pupils are equal, round, and reactive to light. Pulmonary:      Effort: Pulmonary effort is normal.   Musculoskeletal:      Cervical back: Normal range of motion. Right knee: No swelling, deformity or lacerations. Normal range of motion. No tenderness. Left ankle: No swelling, deformity, ecchymosis or lacerations. No tenderness. Normal range of motion. Normal pulse. Left Achilles Tendon: Normal.      Comments: Right lower extremity:  There is moderate tenderness to palpation isolated to the lateral aspect of the ankle centered along the tip of the lateral malleolus and lateral ankle joint. No tenderness to palpation the medial malleolus, calcaneus, Achilles tendon. Normal alignment of the ankle with no instability or deformity. There is moderately restricted range of motion with pain referred to the lateral aspect of the ankle during extremes of motion. There is active ankle dorsiflexion and plantarflexion both motor function intact with flexion extension at the ankle and toes however this is limited due to pain at the lateral aspect of the ankle. Skin is intact. 2+ DP pulse and brisk cap refill present. Sensations intact light touch throughout. Tenderness to palpation along the anterior talofibular ligament. No gross instability to gentle inversion testing and anterior drawer testing of the ankle. Skin:     General: Skin is warm and dry. Neurological:      Mental Status: She is alert and oriented to person, place, and time.    Psychiatric:         Mood and Affect: Mood normal.         Behavior: Behavior normal.            Diagnostic testing:  X-rays reviewed in office, I independently reviewed the films in the office today:   X-ray images of the right ankle from 6/28/2022 were reviewed by myself and discussed with patient:       FINDINGS:   Extensive soft tissue swelling over the lateral malleolus and to a lesser   degree over the medial malleolus. Tiny bony fragments can be seen below the   right fibula indicating a likely avulsion injury. Ankle mortise is normal   and symmetrical.  Calcaneus and talus appear intact. Impression   Small avulsion fragments can be seen under the right fibula in the region of   the lateral malleolus. Extensive soft tissue swelling over the lateral   malleolus. Ankle mortise appears normal and symmetrical.         X-ray images repeated in the office today    3 views of the right ankle show stable alignment of the ankle mortise with    calcifications present at the tip of the lateral malleolus adjacent to the    lateral gutter of the ankle mortise. Mild joint effusion present. Plantar calcaneal spur present. No evidence of acute fracture or    abnormality. Soft tissue swelling present at the lateral aspect of the    ankle and hindfoot. Normal spaces at the tibiotalar articulation. Normal    bone density. Impression: Calcifications present at the lateral gutter of the ankle just    distal fibula         Office Procedures:  No orders of the defined types were placed in this encounter. Assessment and Plan  1. right distal fibula avulsion fracture    2. Right ankle sprain    We reviewed her x-rays which show stable alignment with small calcifications at the tip of the lateral malleolus. I recommended we continue with nonoperative management of this injury. It appears that she is healing well with conservative treatments. I have recommended that we proceed with rehabilitation activities at this time. We will submit approval for physical therapy.     I provided her with an ankle lace up brace. She will wean out of boot as tolerated. She can return to work with light duties and sit down as needed for comfort and rest.    Follow-up in 1 month for check of her progress.         Electronically signed by Dennis Rinaldi MD on 7/15/2022 at 9:09 AM

## 2022-07-15 NOTE — TELEPHONE ENCOUNTER
Patient is to return to work with light duty restrictions progressing from sit down duties. Patient is also requesting physical therapy. Follow up in 1 month.      [facility]

## 2022-07-15 NOTE — PROGRESS NOTES
Patient presents to the office with a right ankle injury [facility]. Pt stated that she fell off a curb on 06/28/22 and twisted her ankle. Pt has been wearing a walking boot since with no issues. Pt stated she had swelling and bruising initially but has subsided. Pt stated she has discontinued pain medication and has no pain today. Pt reports only having pain with rotation in the ankle.

## 2022-07-15 NOTE — PATIENT INSTRUCTIONS
Continue weight-bearing as tolerated. Continue range of motion exercises as instructed. Ice and elevate as needed. Tylenol or Motrin for pain. Follow up in 1 month. Return to work with light duty restrictions advancing from sit down duties. Order for physical therapy sent for approval today. Brace fitted today.

## 2022-07-18 PROBLEM — S82.831A CLOSED AVULSION FRACTURE OF DISTAL FIBULA, RIGHT, INITIAL ENCOUNTER: Status: ACTIVE | Noted: 2022-07-18

## 2022-07-18 ASSESSMENT — ENCOUNTER SYMPTOMS
COLOR CHANGE: 0
WHEEZING: 0
CHEST TIGHTNESS: 0
SHORTNESS OF BREATH: 0
EYE PAIN: 0
VOMITING: 0
EYE REDNESS: 0

## 2022-07-26 NOTE — TELEPHONE ENCOUNTER
I called pt requesting [facility] claim info. Pt stated that she would bring in information today or tomorrow.

## 2022-07-26 NOTE — TELEPHONE ENCOUNTER
No claim has been filed with the [facility] and visits are being billed under Jasbir Ramsey.

## 2022-07-27 NOTE — TELEPHONE ENCOUNTER
Called pt to see if she was still coming into the office to bring her claim info for workers comp. Pt did not answer and I could not leave a message because there was no voicemail set up.

## 2022-08-16 ENCOUNTER — OFFICE VISIT (OUTPATIENT)
Dept: ORTHOPEDIC SURGERY | Age: 32
End: 2022-08-16

## 2022-08-16 VITALS
DIASTOLIC BLOOD PRESSURE: 70 MMHG | BODY MASS INDEX: 34.21 KG/M2 | HEIGHT: 69 IN | OXYGEN SATURATION: 98 % | SYSTOLIC BLOOD PRESSURE: 120 MMHG | WEIGHT: 231 LBS | HEART RATE: 66 BPM

## 2022-08-16 DIAGNOSIS — S82.831A CLOSED AVULSION FRACTURE OF DISTAL FIBULA, RIGHT, INITIAL ENCOUNTER: Primary | ICD-10-CM

## 2022-08-16 PROCEDURE — 99024 POSTOP FOLLOW-UP VISIT: CPT | Performed by: ORTHOPAEDIC SURGERY

## 2022-08-16 NOTE — PROGRESS NOTES
Patient is here for follow up on right foot/ right avulsion fx bwc. Patient states she is feeling normal. Patient states her foot is having no pain, numbness, or tingling. Patient does not have any questions or concerns.

## 2022-08-21 NOTE — PROGRESS NOTES
8/16/2022   Chief Complaint   Patient presents with    Follow-up     Follow up for right foot injury/brace/boot on 7/15/22. History of Present Illness:                             Gloria Fay is a 33 y.o. female who returns today for follow-up of her right distal fibula avulsion fracture. She has noticed improvement in her range of motion and strength. She denies any instability. She has minimal discomfort and occasional mild swelling of the lateral aspect of the ankle worse with repetitive activities. Overall she is pleased with her return of function has been doing well with her job duties at work. Patient is here for follow up on right foot/ right avulsion fx bwc. Patient states she is feeling normal. Patient states her foot is having no pain, numbness, or tingling. Patient does not have any questions or concerns. Medical History  Patient's medications, allergies, past medical, surgical, social and family histories were reviewed and updated as appropriate. Review of Systems                                            Examination:  General Exam:  Vitals: /70 (Site: Right Wrist, Position: Sitting)   Pulse 66   Ht 5' 9\" (1.753 m)   Wt 231 lb (104.8 kg)   SpO2 98%   BMI 34.11 kg/m²    Physical Exam     Right lower extremity:  No tenderness to palpation at the lateral malleolus or ankle joint. Minimal swelling at the lateral aspect of the ankle. Normal alignment. Near full active range of motion present at the ankle with no pain at extremes of motion. No instability during inversion and eversion stress testing. Strength is 5 out of 5 with ankle dorsiflexion and plantarflexion. Sensation motor functions intact throughout the foot.     Diagnostic testing:  X-rays reviewed in office, I independently reviewed the films in the office today:     3 views of the right ankle show stable alignment of the ankle mortise with    calcifications present at the tip of the lateral malleolus adjacent to the    lateral gutter of the ankle mortise. Mild joint effusion present. Plantar calcaneal spur present. No evidence of acute fracture or    abnormality. Soft tissue swelling present at the lateral aspect of the    ankle and hindfoot. Normal spaces at the tibiotalar articulation. Normal    bone density. Impression: Calcifications present at the lateral gutter of the ankle just    distal fibula       Office Procedures:  No orders of the defined types were placed in this encounter. Assessment and Plan  1. right distal fibula avulsion fracture    We reviewed her x-rays which show good alignment of the ankle joint and expected healing. I recommend that she advance her activities as tolerated. Use brace as needed for comfort and support. She will continue home exercise programs. Continue job duties with normal work. Follow-up in 6 weeks for check of her progress of the healing. No x-rays are needed at that time if she is doing well.         Electronically signed by Jere Chase MD on 8/21/2022 at 1:43 PM

## 2022-09-27 ENCOUNTER — OFFICE VISIT (OUTPATIENT)
Dept: ORTHOPEDIC SURGERY | Age: 32
End: 2022-09-27

## 2022-09-27 ENCOUNTER — TELEPHONE (OUTPATIENT)
Dept: ORTHOPEDIC SURGERY | Age: 32
End: 2022-09-27

## 2022-09-27 VITALS
BODY MASS INDEX: 34.11 KG/M2 | DIASTOLIC BLOOD PRESSURE: 82 MMHG | HEIGHT: 69 IN | OXYGEN SATURATION: 97 % | SYSTOLIC BLOOD PRESSURE: 102 MMHG | HEART RATE: 71 BPM

## 2022-09-27 DIAGNOSIS — S82.831A CLOSED AVULSION FRACTURE OF DISTAL FIBULA, RIGHT, INITIAL ENCOUNTER: Primary | ICD-10-CM

## 2022-09-27 PROCEDURE — 99024 POSTOP FOLLOW-UP VISIT: CPT | Performed by: ORTHOPAEDIC SURGERY

## 2022-09-27 NOTE — PROGRESS NOTES
Patient seen in office today for FU R avulsion fx DOI 6/29/22. Stated she's doing well, 0/10 pain level. Reporting she feels like her foot is back to normal. ROM WNL.

## 2022-09-27 NOTE — PROGRESS NOTES
9/27/2022   Chief Complaint   Patient presents with    Follow-up     R avulsion fx [facility]        History of Present Illness:                             Misty Garzon is a 33 y.o. female who returns today for follow-up of her right ankle. She has been doing well with her activities. She feels that her ankle is strong and stable. She has no complaints today. Patient seen in office today for FU R avulsion fx DOI 6/29/22. Stated she's doing well, 0/10 pain level. Reporting she feels like her foot is back to normal. ROM WNL. Medical History  Patient's medications, allergies, past medical, surgical, social and family histories were reviewed and updated as appropriate. Review of Systems                                            Examination:  General Exam:  Vitals: /82   Pulse 71   Ht 5' 9\" (1.753 m)   SpO2 97%   BMI 34.11 kg/m²    Physical Exam       Right lower extremity:  No tenderness to palpation or swelling at the ankle. Painless active range of motion present through ankle motions. Sensation and motor functions intact throughout the foot. Office Procedures:  No orders of the defined types were placed in this encounter. Assessment and Plan  1. right lateral malleolus avulsion fracture, healed    Her ankle has healed well. She has resumed her normal activities with no complaints. She will continue use of her brace as needed for support. Continue with activities as tolerated.   Follow-up as needed        Electronically signed by Jose Alejandro Frey MD on 9/27/2022 at 10:36 AM

## 2023-12-13 ENCOUNTER — OFFICE VISIT (OUTPATIENT)
Dept: FAMILY MEDICINE CLINIC | Age: 33
End: 2023-12-13
Payer: COMMERCIAL

## 2023-12-13 VITALS
HEART RATE: 78 BPM | HEIGHT: 69 IN | WEIGHT: 241 LBS | RESPIRATION RATE: 16 BRPM | TEMPERATURE: 98.3 F | SYSTOLIC BLOOD PRESSURE: 142 MMHG | DIASTOLIC BLOOD PRESSURE: 90 MMHG | BODY MASS INDEX: 35.7 KG/M2 | OXYGEN SATURATION: 98 %

## 2023-12-13 DIAGNOSIS — H60.502 ACUTE OTITIS EXTERNA OF LEFT EAR, UNSPECIFIED TYPE: Primary | ICD-10-CM

## 2023-12-13 PROCEDURE — 99203 OFFICE O/P NEW LOW 30 MIN: CPT | Performed by: NURSE PRACTITIONER

## 2023-12-13 RX ORDER — CIPROFLOXACIN AND DEXAMETHASONE 3; 1 MG/ML; MG/ML
4 SUSPENSION/ DROPS AURICULAR (OTIC) 2 TIMES DAILY
Qty: 7.5 ML | Refills: 0 | Status: SHIPPED | OUTPATIENT
Start: 2023-12-13 | End: 2023-12-20

## 2023-12-13 ASSESSMENT — ENCOUNTER SYMPTOMS
VOMITING: 0
SINUS PAIN: 0
ABDOMINAL PAIN: 0
RHINORRHEA: 0
SHORTNESS OF BREATH: 0
SINUS PRESSURE: 0
WHEEZING: 0
COUGH: 0
DIARRHEA: 0
CHEST TIGHTNESS: 0
SORE THROAT: 0
NAUSEA: 0

## 2023-12-13 NOTE — PROGRESS NOTES
Jose Hernandez   29 y.o.  female  [id number]      Chief Complaint   Patient presents with    Otalgia     L ear pain x6 days, has gotten worse in last 2 days        Subjective:  29 y. o.female is here for a follow up. She has the following chronic/acute medical problems:  Patient Active Problem List   Diagnosis    Calculus of bile duct without cholecystitis with obstruction    Acute cholecystitis    Closed avulsion fracture of distal fibula, right, initial encounter       Patient states her allergy to prednisone is that it made her rash worse. Otalgia   There is pain in the left ear. This is a new problem. The current episode started in the past 7 days (6 days ago). The problem occurs constantly. The problem has been unchanged. There has been no fever. The pain is at a severity of 7/10. Pertinent negatives include no abdominal pain, coughing, diarrhea, ear discharge, headaches, hearing loss, neck pain, rash, rhinorrhea, sore throat or vomiting. She has tried acetaminophen for the symptoms. The treatment provided mild relief. Review of Systems   Constitutional:  Negative for appetite change, chills, fatigue and fever. HENT:  Positive for ear pain. Negative for congestion, ear discharge, hearing loss, postnasal drip, rhinorrhea, sinus pressure, sinus pain, sneezing and sore throat. Respiratory:  Negative for cough, chest tightness, shortness of breath and wheezing. Cardiovascular:  Negative for chest pain and palpitations. Gastrointestinal:  Negative for abdominal pain, diarrhea, nausea and vomiting. Musculoskeletal:  Negative for neck pain. Skin:  Negative for rash. Neurological:  Negative for dizziness, light-headedness and headaches.        Current Outpatient Medications   Medication Sig Dispense Refill    ciprofloxacin-dexamethasone (CIPRODEX) 0.3-0.1 % otic suspension Place 4 drops into the left ear 2 times daily for 7 days 7.5 mL 0    ibuprofen (ADVIL;MOTRIN) 600 MG tablet Take 1 tablet

## 2024-04-01 NOTE — PROGRESS NOTES
Patient seen and examined prior to surgery  Ready for lap juliana   No changes [de-identified] : birth control  [FreeTextEntry6] : Sarah is a 19 year old female with hx of breast mass, and nausea, who presented to the clinic to follow up with birth control methods.   Patient was started on Trilomarzia due to cramps, but she felt nausea aroudn 2-3am every evening, which she attributes to taking it at night. 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 She spoke with Dr. Moore to go back to Trilomarzia. Patient was told to get a negative pregnancy test before starting back with trilomarzia which she was unable to do so did not restart yet.   Denies use of cananbis.  Denies GI history

## (undated) DEVICE — NEEDLE INSUF L120MM DIA2MM DISP FOR PNEUMOPERI ENDOPATH

## (undated) DEVICE — TROCAR: Brand: KII FIOS FIRST ENTRY

## (undated) DEVICE — SYRINGE MED 20ML STD CLR PLAS LUERLOCK TIP N CTRL DISP

## (undated) DEVICE — SUTURE PERMAHAND SZ 2-0 L18IN NONABSORBABLE BLK L26MM FS 685G

## (undated) DEVICE — GOWN,SIRUS,POLYRNF,BRTHSLV,XLN/XL,20/CS: Brand: MEDLINE

## (undated) DEVICE — DRESSING TRNSPAR W2XL2.75IN FLM SHT SEMIPERMEABLE WIND

## (undated) DEVICE — GLOVE SURG SZ 7 CRM LTX FREE POLYISOPRENE POLYMER BEAD ANTI

## (undated) DEVICE — PACK SURG LAP CHOLE

## (undated) DEVICE — GLOVE ORANGE PI 8   MSG9080

## (undated) DEVICE — SUTURE PROL SZ 5-0 L18IN NONABSORBABLE BLU L19MM PC-5 3/8 8630G

## (undated) DEVICE — CHLORAPREP 26ML ORANGE

## (undated) DEVICE — APPLICATOR PREP 26ML 0.7% IOD POVACRYLEX 74% ISO ALC ST

## (undated) DEVICE — BAG SPEC REM 224ML W4XL6IN DIA10MM 1 HND GYN DISP ENDOPCH

## (undated) DEVICE — SET TBNG DISP TIP FOR AHTO

## (undated) DEVICE — SOLUTION IV 1000ML 0.9% SOD CHL FOR IRRIG PLAS CONT

## (undated) DEVICE — NEEDLE HYPO 23GA L1.5IN TURQ POLYPR HUB S STL THN WALL IM

## (undated) DEVICE — GAUZE,SPONGE,2"X2",8PLY,STERILE,LF,2'S: Brand: MEDLINE

## (undated) DEVICE — ELECTRODE ES AD CRDLSS PT RET REM POLYHESIVE

## (undated) DEVICE — TOWEL,OR,DSP,ST,BLUE,STD,6/PK,12PK/CS: Brand: MEDLINE

## (undated) DEVICE — APPLIER CLP M L L11.4IN DIA10MM ENDOSCP ROT MULT FOR LIG

## (undated) DEVICE — TUBING INSUFFLATOR HEAT HI FLO SET PNEUMOCLEAR

## (undated) DEVICE — KIT,ANTI FOG,W/SPONGE & FLUID,SOFT PACK: Brand: MEDLINE

## (undated) DEVICE — TROCAR: Brand: KII® SLEEVE